# Patient Record
Sex: FEMALE | HISPANIC OR LATINO | Employment: FULL TIME | URBAN - METROPOLITAN AREA
[De-identification: names, ages, dates, MRNs, and addresses within clinical notes are randomized per-mention and may not be internally consistent; named-entity substitution may affect disease eponyms.]

---

## 2017-03-17 ENCOUNTER — NON-PROVIDER VISIT (OUTPATIENT)
Dept: URGENT CARE | Facility: PHYSICIAN GROUP | Age: 32
End: 2017-03-17

## 2017-03-17 DIAGNOSIS — Z02.1 PRE-EMPLOYMENT DRUG SCREENING: ICD-10-CM

## 2017-03-17 LAB
AMP AMPHETAMINE: NORMAL
COC COCAINE: NORMAL
INT CON NEG: NEGATIVE
INT CON POS: POSITIVE
MET METHAMPHETAMINES: NORMAL
OPI OPIATES: NORMAL
PCP PHENCYCLIDINE: NORMAL
POC DRUG COMMENT 753798-OCCUPATIONAL HEALTH: NORMAL
THC: NORMAL

## 2017-03-17 PROCEDURE — 80305 DRUG TEST PRSMV DIR OPT OBS: CPT | Performed by: PHYSICIAN ASSISTANT

## 2018-10-11 ENCOUNTER — NON-PROVIDER VISIT (OUTPATIENT)
Dept: URGENT CARE | Facility: PHYSICIAN GROUP | Age: 33
End: 2018-10-11

## 2018-10-11 DIAGNOSIS — Z02.1 PRE-EMPLOYMENT DRUG SCREENING: Primary | ICD-10-CM

## 2018-10-11 PROCEDURE — 80305 DRUG TEST PRSMV DIR OPT OBS: CPT | Performed by: PHYSICIAN ASSISTANT

## 2018-10-12 LAB
AMP AMPHETAMINE: NORMAL
COC COCAINE: NORMAL
INT CON NEG: NORMAL
INT CON POS: NORMAL
MET METHAMPHETAMINES: NORMAL
OPI OPIATES: NORMAL
PCP PHENCYCLIDINE: NORMAL
POC DRUG COMMENT 753798-OCCUPATIONAL HEALTH: NEGATIVE
THC: NORMAL

## 2020-01-30 ENCOUNTER — TELEPHONE (OUTPATIENT)
Dept: SCHEDULING | Facility: IMAGING CENTER | Age: 35
End: 2020-01-30

## 2020-09-25 ENCOUNTER — APPOINTMENT (OUTPATIENT)
Dept: RADIOLOGY | Facility: IMAGING CENTER | Age: 35
End: 2020-09-25
Attending: PHYSICIAN ASSISTANT
Payer: COMMERCIAL

## 2020-09-25 ENCOUNTER — OFFICE VISIT (OUTPATIENT)
Dept: URGENT CARE | Facility: PHYSICIAN GROUP | Age: 35
End: 2020-09-25
Payer: COMMERCIAL

## 2020-09-25 VITALS
DIASTOLIC BLOOD PRESSURE: 76 MMHG | HEIGHT: 62 IN | RESPIRATION RATE: 16 BRPM | SYSTOLIC BLOOD PRESSURE: 112 MMHG | WEIGHT: 140 LBS | TEMPERATURE: 98.4 F | HEART RATE: 77 BPM | BODY MASS INDEX: 25.76 KG/M2 | OXYGEN SATURATION: 95 %

## 2020-09-25 DIAGNOSIS — J06.9 UPPER RESPIRATORY TRACT INFECTION, UNSPECIFIED TYPE: ICD-10-CM

## 2020-09-25 DIAGNOSIS — R05.9 COUGH: ICD-10-CM

## 2020-09-25 DIAGNOSIS — R06.02 SHORTNESS OF BREATH: ICD-10-CM

## 2020-09-25 PROCEDURE — 71046 X-RAY EXAM CHEST 2 VIEWS: CPT | Mod: TC | Performed by: PHYSICIAN ASSISTANT

## 2020-09-25 PROCEDURE — 99204 OFFICE O/P NEW MOD 45 MIN: CPT | Performed by: PHYSICIAN ASSISTANT

## 2020-09-25 RX ORDER — AZITHROMYCIN 250 MG/1
TABLET, FILM COATED ORAL
Qty: 6 TAB | Refills: 0 | Status: SHIPPED | OUTPATIENT
Start: 2020-09-25 | End: 2022-03-16

## 2020-09-25 RX ORDER — PREDNISONE 20 MG/1
40 TABLET ORAL DAILY
Qty: 10 TAB | Refills: 0 | Status: SHIPPED | OUTPATIENT
Start: 2020-09-25 | End: 2020-09-30

## 2020-09-25 RX ORDER — ALBUTEROL SULFATE 90 UG/1
2 AEROSOL, METERED RESPIRATORY (INHALATION) EVERY 6 HOURS PRN
Qty: 8.5 G | Refills: 0 | Status: SHIPPED | OUTPATIENT
Start: 2020-09-25 | End: 2022-03-16

## 2020-09-25 ASSESSMENT — ENCOUNTER SYMPTOMS
MYALGIAS: 0
SHORTNESS OF BREATH: 1
TINGLING: 0
HEADACHES: 0
NAUSEA: 0
STRIDOR: 0
HEARTBURN: 0
SPUTUM PRODUCTION: 1
ABDOMINAL PAIN: 0
WHEEZING: 1
DIARRHEA: 0
PALPITATIONS: 0
FEVER: 0
SINUS PAIN: 0
DIAPHORESIS: 0
SORE THROAT: 0
DIZZINESS: 0
CHILLS: 0
VOMITING: 0
COUGH: 1
EYE PAIN: 0

## 2020-09-25 NOTE — LETTER
[facility] [place]  [address]  [place] [address]     September 25, 2020    Patient: Yaquelin Mccray   YOB: 1985   Date of Visit: 9/25/2020       To Whom It May Concern:    Yaquelin Mccray was seen and treated in our department on 9/25/2020.  Please excuse from work on 9/25/2020 through 9/28/2020.  Please call with any questions or concerns at [phone].    Sincerely,     Luca Viveros P.A.-C.

## 2020-09-26 NOTE — PROGRESS NOTES
Subjective:   Yaquelin Mccray is a 35 y.o. female who presents for Asthma (feels like asthma is getting worse x1 month , cough x3 months )    HPI:  This is a very pleasant 35-year-old female presenting to the clinic with a cough and shortness of breath which have been ongoing but progressively worsening over the last 3 months.  She states it feels like she has asthma she has never been diagnosed with asthma in the past.  Symptoms are worse at night and early in the morning.  Over the last week her cough has become more productive of clear phlegm.  She denies any associated chest pain, fevers, chills, myalgias or headaches.  She has tried multiple over-the-counter medications without relief.  She denies any past medical history significant for seasonal allergies.  She has no known ill contacts.      Review of Systems   Constitutional: Negative for chills, diaphoresis, fever and malaise/fatigue.   HENT: Negative for congestion, ear pain, sinus pain and sore throat.    Eyes: Negative for pain.   Respiratory: Positive for cough, sputum production, shortness of breath and wheezing. Negative for stridor.    Cardiovascular: Negative for chest pain and palpitations.   Gastrointestinal: Negative for abdominal pain, diarrhea, heartburn, nausea and vomiting.   Musculoskeletal: Negative for myalgias.   Neurological: Negative for dizziness, tingling and headaches.   Endo/Heme/Allergies: Negative for environmental allergies.       Medications:    • amoxicillin Caps  • BACTRIM DS PO  • VICODIN PO    Allergies: Patient has no known allergies.    Problem List: Yaquelin Mccray does not have a problem list on file.    Surgical History:  No past surgical history on file.    Past Social Hx: Yaquelin Mccray  reports that she has never smoked. She has never used smokeless tobacco. She reports that she does not drink alcohol or use drugs.     Past Family Hx:  Yaquelin Truong  "Jamin Gallowayubias family history is not on file.     Problem list, medications, and allergies reviewed by myself today in Epic.     Objective:     /76 (BP Location: Left arm, Patient Position: Sitting, BP Cuff Size: Adult)   Pulse 77   Temp 36.9 °C (98.4 °F) (Temporal)   Resp 16   Ht 1.575 m (5' 2\")   Wt 63.5 kg (140 lb)   SpO2 95%   BMI 25.61 kg/m²     Physical Exam  Constitutional:       General: She is not in acute distress.     Appearance: Normal appearance. She is not ill-appearing, toxic-appearing or diaphoretic.   HENT:      Head: Normocephalic and atraumatic.      Right Ear: Tympanic membrane, ear canal and external ear normal.      Left Ear: Tympanic membrane, ear canal and external ear normal.      Nose: Nose normal. No congestion or rhinorrhea.      Mouth/Throat:      Mouth: Mucous membranes are moist.      Pharynx: No oropharyngeal exudate or posterior oropharyngeal erythema.   Eyes:      Conjunctiva/sclera: Conjunctivae normal.   Neck:      Musculoskeletal: Normal range of motion. No muscular tenderness.   Cardiovascular:      Rate and Rhythm: Normal rate and regular rhythm.      Pulses: Normal pulses.      Heart sounds: Normal heart sounds.   Pulmonary:      Effort: Pulmonary effort is normal.      Breath sounds: Wheezing and rhonchi present. No rales.   Abdominal:      Palpations: Abdomen is soft.      Tenderness: There is no abdominal tenderness.   Lymphadenopathy:      Cervical: No cervical adenopathy.   Skin:     General: Skin is warm and dry.      Capillary Refill: Capillary refill takes less than 2 seconds.   Neurological:      General: No focal deficit present.      Mental Status: She is alert and oriented to person, place, and time. Mental status is at baseline.   Psychiatric:         Mood and Affect: Mood normal.         Thought Content: Thought content normal.     Chest x-ray:  FINDINGS:     The heart is not enlarged. No focal consolidation, pleural effusion or pneumothorax is " identified.  Costophrenic angles are clear. There is mild bronchial wall thickening.     IMPRESSION:  Mild bronchial wall thickening can be seen in bronchitis.    Assessment/Plan:     Diagnosis and associated orders:   1. Shortness of breath    - DX-CHEST-2 VIEWS; Future  - albuterol 108 (90 Base) MCG/ACT Aero Soln inhalation aerosol; Inhale 2 Puffs by mouth every 6 hours as needed for Shortness of Breath.  Dispense: 8.5 g; Refill: 0  - predniSONE (DELTASONE) 20 MG Tab; Take 2 Tabs by mouth every day for 5 days.  Dispense: 10 Tab; Refill: 0  - azithromycin (ZITHROMAX) 250 MG Tab; Take 2 tablets PO on day one, then 1 tablet PO on day two to five.  Dispense: 6 Tab; Refill: 0    2. Cough    - DX-CHEST-2 VIEWS; Future  - albuterol 108 (90 Base) MCG/ACT Aero Soln inhalation aerosol; Inhale 2 Puffs by mouth every 6 hours as needed for Shortness of Breath.  Dispense: 8.5 g; Refill: 0  - predniSONE (DELTASONE) 20 MG Tab; Take 2 Tabs by mouth every day for 5 days.  Dispense: 10 Tab; Refill: 0  - azithromycin (ZITHROMAX) 250 MG Tab; Take 2 tablets PO on day one, then 1 tablet PO on day two to five.  Dispense: 6 Tab; Refill: 0    3. Upper respiratory tract infection, unspecified type    - albuterol 108 (90 Base) MCG/ACT Aero Soln inhalation aerosol; Inhale 2 Puffs by mouth every 6 hours as needed for Shortness of Breath.  Dispense: 8.5 g; Refill: 0  - predniSONE (DELTASONE) 20 MG Tab; Take 2 Tabs by mouth every day for 5 days.  Dispense: 10 Tab; Refill: 0  - azithromycin (ZITHROMAX) 250 MG Tab; Take 2 tablets PO on day one, then 1 tablet PO on day two to five.  Dispense: 6 Tab; Refill: 0       Comments/MDM:       • Patient has had signs and symptoms consistent with viral upper respiratory infection for the past 3 months.  Her symptoms have progressively worsened over this last week.  Her coughs are more frequent and more productive.  She does have increased shortness of breath over this time.  Due to the length of her symptoms  and nature of her symptoms we decided to start on oral steroids and antibiotics.  Prescription for albuterol was also provided.  Red flag symptoms were discussed with the patient today.  If any of these present return to clinic or nearest emergency department.             Differential diagnosis, natural history, supportive care, and indications for immediate follow-up discussed.    Advised the patient to follow-up with the primary care physician for recheck, reevaluation, and consideration of further management.    Please note that this dictation was created using voice recognition software. I have made reasonable attempt to correct obvious errors, but I expect that there are errors of grammar and possibly content that I did not discover before finalizing the note.    This note was electronically signed by DILCIA Viveros PA-C

## 2020-10-06 ENCOUNTER — HOSPITAL ENCOUNTER (OUTPATIENT)
Facility: MEDICAL CENTER | Age: 35
End: 2020-10-06
Attending: NURSE PRACTITIONER
Payer: COMMERCIAL

## 2020-10-06 ENCOUNTER — OFFICE VISIT (OUTPATIENT)
Dept: URGENT CARE | Facility: PHYSICIAN GROUP | Age: 35
End: 2020-10-06
Payer: COMMERCIAL

## 2020-10-06 VITALS
SYSTOLIC BLOOD PRESSURE: 96 MMHG | DIASTOLIC BLOOD PRESSURE: 60 MMHG | WEIGHT: 140.8 LBS | OXYGEN SATURATION: 98 % | RESPIRATION RATE: 16 BRPM | HEIGHT: 62 IN | HEART RATE: 73 BPM | TEMPERATURE: 99.1 F | BODY MASS INDEX: 25.91 KG/M2

## 2020-10-06 DIAGNOSIS — Z87.09 H/O BRONCHITIS: ICD-10-CM

## 2020-10-06 DIAGNOSIS — R06.02 SOB (SHORTNESS OF BREATH): ICD-10-CM

## 2020-10-06 DIAGNOSIS — Z88.9 H/O SEASONAL ALLERGIES: ICD-10-CM

## 2020-10-06 DIAGNOSIS — R05.9 COUGH: ICD-10-CM

## 2020-10-06 PROCEDURE — 99214 OFFICE O/P EST MOD 30 MIN: CPT | Performed by: NURSE PRACTITIONER

## 2020-10-06 PROCEDURE — U0003 INFECTIOUS AGENT DETECTION BY NUCLEIC ACID (DNA OR RNA); SEVERE ACUTE RESPIRATORY SYNDROME CORONAVIRUS 2 (SARS-COV-2) (CORONAVIRUS DISEASE [COVID-19]), AMPLIFIED PROBE TECHNIQUE, MAKING USE OF HIGH THROUGHPUT TECHNOLOGIES AS DESCRIBED BY CMS-2020-01-R: HCPCS

## 2020-10-06 RX ORDER — ALBUTEROL SULFATE 90 UG/1
2 AEROSOL, METERED RESPIRATORY (INHALATION) EVERY 6 HOURS PRN
Qty: 8.5 G | Refills: 0 | Status: SHIPPED | OUTPATIENT
Start: 2020-10-06 | End: 2020-10-09

## 2020-10-06 RX ORDER — BENZONATATE 100 MG/1
100 CAPSULE ORAL 3 TIMES DAILY PRN
Qty: 60 CAP | Refills: 0 | Status: SHIPPED | OUTPATIENT
Start: 2020-10-06 | End: 2022-03-16

## 2020-10-06 ASSESSMENT — ENCOUNTER SYMPTOMS
WHEEZING: 0
WEAKNESS: 0
PALPITATIONS: 0
SENSORY CHANGE: 0
ORTHOPNEA: 0
CHILLS: 0
FEVER: 0
SINUS PAIN: 0
SPUTUM PRODUCTION: 1
BACK PAIN: 0
TINGLING: 0
DIZZINESS: 0
HEADACHES: 0
COUGH: 1
MYALGIAS: 0
SHORTNESS OF BREATH: 1
SORE THROAT: 0

## 2020-10-07 DIAGNOSIS — Z88.9 H/O SEASONAL ALLERGIES: ICD-10-CM

## 2020-10-07 DIAGNOSIS — R05.9 COUGH: ICD-10-CM

## 2020-10-07 DIAGNOSIS — Z87.09 H/O BRONCHITIS: ICD-10-CM

## 2020-10-07 LAB
COVID ORDER STATUS COVID19: NORMAL
SARS-COV-2 RNA RESP QL NAA+PROBE: NOTDETECTED
SPECIMEN SOURCE: NORMAL

## 2020-10-07 NOTE — PROGRESS NOTES
Subjective:      Yaquelin Mccray is a 35 y.o. female who presents with Shortness of Breath (difficulty breathing, cough, phlegm, x2 days )            HPI  States cough, SOB, mld intermittent phlegm (yellow). Seen in  2 weeks ago for similar symptoms. Denies h/o asthma but has mild Fall seasonal allergies. No allergy med used. Denies sore throat or PND. CXR 2 weeks ago, WNL. H/o bronchitis. Denies wheeze. No otc meds taken. Supine positions increases cough, walking she has no cough. No ear problems. No known exposure to [place], works [employer].     PMH:  has no past medical history on file.  MEDS:   Current Outpatient Medications:   •  albuterol 108 (90 Base) MCG/ACT Aero Soln inhalation aerosol, Inhale 2 Puffs by mouth every 6 hours as needed for Shortness of Breath., Disp: 8.5 g, Rfl: 0  •  azithromycin (ZITHROMAX) 250 MG Tab, Take 2 tablets PO on day one, then 1 tablet PO on day two to five., Disp: 6 Tab, Rfl: 0  •  AMOXICILLIN 500 MG PO CAPS, , Disp: , Rfl:   •  BACTRIM DS PO, , Disp: , Rfl:   •  VICODIN PO, , Disp: , Rfl:   ALLERGIES: No Known Allergies  SURGHX: History reviewed. No pertinent surgical history.  SOCHX:  reports that she has never smoked. She has never used smokeless tobacco. She reports that she does not drink alcohol or use drugs.  FH: Family history was reviewed, no pertinent findings to report    Review of Systems   Constitutional: Negative for chills, fever and malaise/fatigue.   HENT: Positive for congestion. Negative for ear pain, sinus pain and sore throat.    Respiratory: Positive for cough, sputum production and shortness of breath. Negative for wheezing.    Cardiovascular: Negative for chest pain, palpitations and orthopnea.   Musculoskeletal: Negative for back pain and myalgias.   Skin: Negative for itching and rash.   Neurological: Negative for dizziness, tingling, sensory change, weakness and headaches.   Endo/Heme/Allergies: Positive for  "environmental allergies.   All other systems reviewed and are negative.         Objective:     BP (!) 96/60 (BP Location: Right arm, Patient Position: Sitting, BP Cuff Size: Adult)   Pulse 73   Temp 37.3 °C (99.1 °F) (Temporal)   Resp 16   Ht 1.575 m (5' 2\")   Wt 63.9 kg (140 lb 12.8 oz)   SpO2 98%   BMI 25.75 kg/m²      Physical Exam  Vitals signs reviewed.   Constitutional:       General: She is awake. She is not in acute distress.     Appearance: Normal appearance. She is well-developed. She is not ill-appearing, toxic-appearing or diaphoretic.   HENT:      Head: Normocephalic.      Right Ear: Tympanic membrane, ear canal and external ear normal.      Left Ear: Ear canal and external ear normal. A middle ear effusion is present.      Nose: Congestion and rhinorrhea present. No nasal tenderness or mucosal edema.      Mouth/Throat:      Mouth: Mucous membranes are moist.      Pharynx: Posterior oropharyngeal erythema present. No pharyngeal swelling, oropharyngeal exudate or uvula swelling.      Tonsils: No tonsillar exudate or tonsillar abscesses. 1+ on the right. 1+ on the left.   Eyes:      General:         Right eye: No discharge.         Left eye: No discharge.      Conjunctiva/sclera: Conjunctivae normal.      Pupils: Pupils are equal, round, and reactive to light.   Neck:      Musculoskeletal: Normal range of motion.   Cardiovascular:      Rate and Rhythm: Normal rate.   Pulmonary:      Effort: Pulmonary effort is normal. No tachypnea, accessory muscle usage or respiratory distress.      Breath sounds: Normal breath sounds and air entry. No stridor, decreased air movement or transmitted upper airway sounds. No decreased breath sounds, wheezing, rhonchi or rales.   Abdominal:      General: Bowel sounds are normal. There is no distension.      Palpations: Abdomen is soft.      Tenderness: There is no abdominal tenderness. There is no guarding or rebound.   Musculoskeletal: Normal range of motion. "   Lymphadenopathy:      Cervical: No cervical adenopathy.   Skin:     General: Skin is warm and dry.   Neurological:      Mental Status: She is alert and oriented to person, place, and time.   Psychiatric:         Behavior: Behavior is cooperative.               Provider wore N95 and/or surgical mask, eye goggles and/or gloves with hand sanitization during exam.    Assessment/Plan:        1. H/O seasonal allergies    - REFERRAL TO FOLLOW-UP WITH PRIMARY CARE  - COVID/SARS COV-2 PCR; Future    2. Cough  - REFERRAL TO FOLLOW-UP WITH PRIMARY CARE  - COVID/SARS COV-2 PCR; Future    3. H/O bronchitis    - REFERRAL TO FOLLOW-UP WITH PRIMARY CARE  - COVID/SARS COV-2 PCR; Future    4. SOB (shortness of breath)    Recommend longer acting allergy med x 7 days  Increase water intake  May use Tylenol prn for any fever or body aches  Get rest  Salt water gargle prn  Flonase prn   Monitor for fevers, worse cough, SOB, CP, chest tightness, sinus problems- need re-evaluation  AVS summary printed with COVID info provided  [facility] for copy of test result or go through Medical Records    May call listed phone number below/may leave message for COVID result, may take up to 48 hrs for result, patient notified    Call this number [phone] leave message

## 2020-10-07 NOTE — PATIENT INSTRUCTIONS
INSTRUCTIONS FOR COVID-19 OR ANY OTHER INFECTIOUS RESPIRATORY ILLNESSES    The Centers for Disease Control and Prevention (CDC) states that early indications for COVID-19 include cough, shortness of breath, difficulty breathing, or at least two of the following symptoms: chills, shaking with chills, muscle pain, headache, sore throat, and loss of taste or smell. Symptoms can range from mild to severe and may appear up to two weeks after exposure to the virus.    The practice of self-isolation and quarantine helps protect the public and your family by  preventing exposure to people who have or may have a contagious disease. Please follow the prevention steps below as based on CDC guidelines:    WHEN TO STOP ISOLATION: Persons with COVID-19 or any other infectious respiratory illness who have symptoms and were advised to care for themselves at home may discontinue home isolation under the following conditions:  · At least 24 hours have passed since recovery defined as resolution of fever without the use of fever-reducing medications; AND,  · Improvement in respiratory symptoms (e.g., cough, shortness of breath); AND,  · At least 10 days have passed since symptoms first appeared and have had no subsequent illness.    MONITOR YOUR SYMPTOMS: If your illness is worsening, seek prompt medical attention. If you have a medical emergency and need to call 911, notify the dispatch personnel that you have, or are being evaluated for confirmed or suspected COVID-19 or another infectious respiratory illness. Wear a facemask if possible.    ACTIVITY RESTRICTION: restrict activities outside your home, except for getting medical care. Do not go to work, school, or public areas. Avoid using public transportation, ride-sharing, or taxis.    SCHEDULED MEDICAL APPOINTMENTS: Notify your provider that you have, or are being evaluated for, confirmed or suspected COVID-19 or another infectious respiratory. This will help the healthcare  provider’s office safely take care of you and keep other people from getting exposed or infected.    FACEMASKS, when to wear: Anytime you are away from your home or around other people or pets. If you are unable to wear one, maintain a minimum of 6 feet distancing from others.    LIVING ENVIRONMENT: Stay in a separate room from other people and pets. If possible, use a separate bathroom, have someone else care for your pets and avoid sharing household items. Any items used should be washed thoroughly with soap and water. Clean all “high-touch” surfaces every day. Use a household cleaning spray or wipe, according to the label instructions. High touch surfaces include (but are not limited to) counters, tabletops, doorknobs, bathroom fixtures, toilets, phones, keyboards, tablets, and bedside tables.     HAND WASHING: Frequently wash hands with soap and water for at least 20 seconds,  especially after blowing your nose, coughing, or sneezing; going to the bathroom; before and after interacting with pets; and before and after eating or preparing food. If hands are visibly dirty use soap and water. If soap and water are not available, use an alcohol-based hand  with at least 60% alcohol. Avoid touching your eyes, nose, and mouth with unwashed hands. Cover your coughs and sneezes with a tissue. Throw used tissues in a lined trash can. Immediately wash your hands.    ACTIVE/FACILITATED SELF-MONITORING: Follow instructions provided by your local health department or health professionals, as appropriate. When working with your local health department check their available hours.    [facility]   Phone Number   [facility] [phone]   [facility] [place] [phone]   [place] [address]   [place] [phone]     IF YOU HAVE CONFIRMED POSITIVE COVID-19:    Those who have completely recovered from COVID-19 may have immune-boosting antibodies in their plasma--called “convalescent plasma”--that could be  used to treat critically ill COVID19 patients.    [facility] is excited to begin working with [facility] on collecting convalescent plasma from  people who have recovered from COVID-19 as part of a program to treat patients infected with the virus. This FDA-approved “emergency investigational new drug” is a special blood product containing antibodies that may give patients an extra boost to fight the virus.    To be eligible to donate convalescent plasma, you must have a prior COVID-19 diagnosis documented by a laboratory test (or a positive test result for SARS-CoV-2 antibodies) and meet additional eligibility requirements.    If you are interested in donating convalescent plasma or have any additional questions, please contact the [facility] Convalescent Plasma  at [phone] or via e-mail at [email or web address].

## 2020-10-08 ENCOUNTER — TELEPHONE (OUTPATIENT)
Dept: URGENT CARE | Facility: CLINIC | Age: 35
End: 2020-10-08

## 2020-10-08 NOTE — TELEPHONE ENCOUNTER
Called and left message on given phone number at time of visit [phone] (in chart notes) regarding COVID test result to be negative.

## 2020-10-09 ENCOUNTER — OFFICE VISIT (OUTPATIENT)
Dept: MEDICAL GROUP | Facility: PHYSICIAN GROUP | Age: 35
End: 2020-10-09
Payer: COMMERCIAL

## 2020-10-09 VITALS
HEART RATE: 74 BPM | HEIGHT: 62 IN | DIASTOLIC BLOOD PRESSURE: 72 MMHG | BODY MASS INDEX: 26.31 KG/M2 | RESPIRATION RATE: 16 BRPM | OXYGEN SATURATION: 97 % | TEMPERATURE: 98.1 F | WEIGHT: 143 LBS | SYSTOLIC BLOOD PRESSURE: 114 MMHG

## 2020-10-09 DIAGNOSIS — N83.201 CYST OF RIGHT OVARY: ICD-10-CM

## 2020-10-09 DIAGNOSIS — R05.9 COUGH: ICD-10-CM

## 2020-10-09 PROCEDURE — 99204 OFFICE O/P NEW MOD 45 MIN: CPT | Performed by: FAMILY MEDICINE

## 2020-10-09 RX ORDER — DEXAMETHASONE 4 MG/1
1 TABLET ORAL 2 TIMES DAILY
Qty: 1 EACH | Refills: 3 | Status: SHIPPED | OUTPATIENT
Start: 2020-10-09 | End: 2020-10-16

## 2020-10-09 ASSESSMENT — PATIENT HEALTH QUESTIONNAIRE - PHQ9: CLINICAL INTERPRETATION OF PHQ2 SCORE: 0

## 2020-10-09 NOTE — ASSESSMENT & PLAN NOTE
This is a new condition.    Symptom onset: has developed cough over the past 2 months exacerbated by the smoke.   Current symptoms: persistent cough and SOB  Since onset symptoms are: Unchanged  Modifying factors: has gone to  for this and tested negative for covid 19 x2.   Treatments tried: Uses albuterol which helps  Associated symptoms: endorses wheezing

## 2020-10-09 NOTE — ASSESSMENT & PLAN NOTE
This is a new problem  The patient is s/p pelvic US 2 months ago, ordered by the health dept and was told that she has a right ovarian cyst.  Now has on/off pain.  She would like referral to gyn  She does not take anything for it

## 2020-10-09 NOTE — PROGRESS NOTES
Subjective:     CC:  Diagnoses of Cough and Cyst of right ovary were pertinent to this visit.    HISTORY OF THE PRESENT ILLNESS: Patient is a 35 y.o. female. This pleasant patient is here today to establish care and discuss the following problems.   Prior PCP: None.    Cough  This is a new condition.    Symptom onset: has developed cough over the past 2 months exacerbated by the smoke.   Current symptoms: persistent cough and SOB  Since onset symptoms are: Unchanged  Modifying factors: has gone to  for this and tested negative for covid 19 x2.   Treatments tried: Uses albuterol which helps  Associated symptoms: endorses wheezing      Cyst of right ovary  This is a new problem  The patient is s/p pelvic US 2 months ago, ordered by the health dept and was told that she has a right ovarian cyst.  Now has on/off pain.  She would like referral to gyn  She does not take anything for it      Allergies: Patient has no known allergies.    Current Outpatient Medications Ordered in Epic   Medication Sig Dispense Refill   • fluticasone (FLOVENT HFA) 110 MCG/ACT Aerosol Inhale 1 Puff by mouth 2 times a day. 1 Each 3   • benzonatate (TESSALON) 100 MG Cap Take 1 Cap by mouth 3 times a day as needed for Cough. 60 Cap 0   • albuterol 108 (90 Base) MCG/ACT Aero Soln inhalation aerosol Inhale 2 Puffs by mouth every 6 hours as needed for Shortness of Breath. 8.5 g 0   • azithromycin (ZITHROMAX) 250 MG Tab Take 2 tablets PO on day one, then 1 tablet PO on day two to five. 6 Tab 0   • VICODIN PO        No current Epic-ordered facility-administered medications on file.        History reviewed. No pertinent past medical history.    Past Surgical History:   Procedure Laterality Date   • PRIMARY C SECTION         Social History     Tobacco Use   • Smoking status: Never Smoker   • Smokeless tobacco: Never Used   Substance Use Topics   • Alcohol use: No     Comment: social   • Drug use: No       Social History     Social History Narrative     "** Merged History Encounter **            Family History   Problem Relation Age of Onset   • No Known Problems Mother    • No Known Problems Father        Health Maintenance: Completed    ROS:   Gen: no fevers/chills, no changes in weight  Eyes: no changes in vision  ENT: no sore throat, no hearing loss, no bloody nose  Pulm: Per HPI  CV: no chest pain, no palpitations  GI: no nausea/vomiting, no diarrhea  : no dysuria  MSk: no myalgias  Skin: no rash  Neuro: no headaches, no numbness/tingling  Heme/Lymph: no easy bruising      Objective:     Exam: /72 (BP Location: Left arm, Patient Position: Sitting, BP Cuff Size: Adult)   Pulse 74   Temp 36.7 °C (98.1 °F) (Temporal)   Resp 16   Ht 1.575 m (5' 2\")   Wt 64.9 kg (143 lb)   SpO2 97%  Body mass index is 26.16 kg/m².    General: Normal appearing. No distress.  HENT: Normocephalic. Ears normal shape and contour, canals are clear bilaterally, tympanic membranes are benign, nasal mucosa benign, oropharynx is without erythema, edema or exudates.   Eyes: Conjunctiva clear lids without ptosis, pupils equal and reactive to light accommodation.  Neck: Supple without JVD. Thyroid is not enlarged.  Pulmonary: Clear to ausculation.  Normal effort. No rales, ronchi, or wheezing.  Cardiovascular: Regular rate and rhythm without murmur. Radial pulses are intact and equal bilaterally.  Abdomen: Soft, nontender, nondistended. Normal bowel sounds. Liver and spleen are not palpable  Neurologic: Moves all extremities  Lymph: No cervical or supraclavicular lymph nodes are palpable  Skin: Warm and dry.  No obvious lesions.  Musculoskeletal: Normal gait. No extremity cyanosis, clubbing, or edema.  Psych: Normal mood and affect. Alert and oriented x3. Judgment and insight is normal.    Assessment & Plan:   35 y.o. female with the following -    1. Cough  This is a new problem.  The patient has been experiencing cough over the past 2 months.  She has gone to urgent care x2 for " this.  She has also tested negative for COVID-19 x2.  Chest x-ray suggestive of bronchitis.  Reactive airway disease/asthma is also considered.  She has been using albuterol inhaler as needed every night which improves her symptoms.  Endorses wheezing.  We will do a trial of ICS at the same time.  Return precautions given.  - fluticasone (FLOVENT HFA) 110 MCG/ACT Aerosol; Inhale 1 Puff by mouth 2 times a day.  Dispense: 1 Each; Refill: 3    2. Cyst of right ovary  This is a new problem.  A couple of months ago she had an ultrasound of the pelvis which was positive for a right ovarian cyst.  She would like to establish with gynecology to go over this.  She may take NSAIDs for now to relieve her pain.  - REFERRAL TO OB/GYN      Return in about 3 months (around 1/9/2021).    Please note that this dictation was created using voice recognition software. I have made every reasonable attempt to correct obvious errors, but I expect that there are errors of grammar and possibly content that I did not discover before finalizing the note.

## 2020-10-16 ENCOUNTER — TELEPHONE (OUTPATIENT)
Dept: MEDICAL GROUP | Facility: PHYSICIAN GROUP | Age: 35
End: 2020-10-16

## 2020-10-16 RX ORDER — BECLOMETHASONE DIPROPIONATE HFA 80 UG/1
1 AEROSOL, METERED RESPIRATORY (INHALATION) 2 TIMES DAILY
Qty: 1 EACH | Refills: 3 | Status: SHIPPED | OUTPATIENT
Start: 2020-10-16 | End: 2022-03-16

## 2020-10-16 NOTE — TELEPHONE ENCOUNTER
Phone Number Called: [phone] (home)       Call outcome: Spoke to patient regarding message below.    Message: Patient was advised if inhaler was not covered by insurance to call and have a different one sent to the pharmacy. Please Advise. Lm

## 2021-08-31 ENCOUNTER — NON-PROVIDER VISIT (OUTPATIENT)
Dept: URGENT CARE | Facility: PHYSICIAN GROUP | Age: 36
End: 2021-08-31
Payer: COMMERCIAL

## 2021-08-31 DIAGNOSIS — Z02.1 PRE-EMPLOYMENT DRUG SCREENING: Primary | ICD-10-CM

## 2021-08-31 PROCEDURE — 80305 DRUG TEST PRSMV DIR OPT OBS: CPT | Performed by: PHYSICIAN ASSISTANT

## 2021-09-01 LAB
AMP AMPHETAMINE: NORMAL
BAR BARBITURATES: NORMAL
BZO BENZODIAZEPINES: NORMAL
COC COCAINE: NORMAL
INT CON NEG: NORMAL
INT CON POS: NORMAL
MDMA ECSTASY: NORMAL
MET METHAMPHETAMINES: NORMAL
MTD METHADONE: NORMAL
OPI OPIATES: NORMAL
OXY OXYCODONE: NORMAL
PCP PHENCYCLIDINE: NORMAL
POC URINE DRUG SCREEN OCDRS: NEGATIVE
THC: NORMAL

## 2021-09-09 ENCOUNTER — HOSPITAL ENCOUNTER (OUTPATIENT)
Facility: MEDICAL CENTER | Age: 36
End: 2021-09-09
Attending: NURSE PRACTITIONER
Payer: MEDICAID

## 2021-09-09 ENCOUNTER — OFFICE VISIT (OUTPATIENT)
Dept: URGENT CARE | Facility: CLINIC | Age: 36
End: 2021-09-09
Payer: MEDICAID

## 2021-09-09 ENCOUNTER — APPOINTMENT (OUTPATIENT)
Dept: URGENT CARE | Facility: PHYSICIAN GROUP | Age: 36
End: 2021-09-09
Payer: COMMERCIAL

## 2021-09-09 VITALS
WEIGHT: 147.8 LBS | TEMPERATURE: 98.6 F | OXYGEN SATURATION: 95 % | RESPIRATION RATE: 16 BRPM | BODY MASS INDEX: 29.02 KG/M2 | HEIGHT: 60 IN | SYSTOLIC BLOOD PRESSURE: 122 MMHG | DIASTOLIC BLOOD PRESSURE: 66 MMHG | HEART RATE: 85 BPM

## 2021-09-09 DIAGNOSIS — Z11.52 ENCOUNTER FOR SCREENING FOR COVID-19: Primary | ICD-10-CM

## 2021-09-09 PROCEDURE — U0003 INFECTIOUS AGENT DETECTION BY NUCLEIC ACID (DNA OR RNA); SEVERE ACUTE RESPIRATORY SYNDROME CORONAVIRUS 2 (SARS-COV-2) (CORONAVIRUS DISEASE [COVID-19]), AMPLIFIED PROBE TECHNIQUE, MAKING USE OF HIGH THROUGHPUT TECHNOLOGIES AS DESCRIBED BY CMS-2020-01-R: HCPCS

## 2021-09-09 PROCEDURE — U0005 INFEC AGEN DETEC AMPLI PROBE: HCPCS

## 2021-09-09 PROCEDURE — 99212 OFFICE O/P EST SF 10 MIN: CPT | Mod: CS | Performed by: NURSE PRACTITIONER

## 2021-09-09 ASSESSMENT — ENCOUNTER SYMPTOMS
CHILLS: 0
MYALGIAS: 0
NAUSEA: 0
FEVER: 0
VOMITING: 0

## 2021-09-09 NOTE — PROGRESS NOTES
Subjective:     Yaquelin Mccray is a 36 y.o. female who presents for Coronavirus Screening (needs test to travel)      HPI  Pt presents for evaluation of a new problem. Yaquelin is a 36 year old female who presents to  today for a COVID screen as she needs to travel. No symptoms, no known ill contacts.     Review of Systems   Constitutional: Negative for chills and fever.   Gastrointestinal: Negative for nausea and vomiting.   Musculoskeletal: Negative for myalgias.       PMH: No past medical history on file.  ALLERGIES: No Known Allergies  SURGHX:   Past Surgical History:   Procedure Laterality Date   • PRIMARY C SECTION       SOCHX:   Social History     Socioeconomic History   • Marital status:      Spouse name: Not on file   • Number of children: Not on file   • Years of education: Not on file   • Highest education level: Not on file   Occupational History   • Not on file   Tobacco Use   • Smoking status: Never Smoker   • Smokeless tobacco: Never Used   Vaping Use   • Vaping Use: Never used   Substance and Sexual Activity   • Alcohol use: No     Comment: social   • Drug use: No   • Sexual activity: Not on file   Other Topics Concern   • Not on file   Social History Narrative    ** Merged History Encounter **          Social Determinants of Health     Financial Resource Strain:    • Difficulty of Paying Living Expenses:    Food Insecurity:    • Worried About Running Out of Food in the Last Year:    • Ran Out of Food in the Last Year:    Transportation Needs:    • Lack of Transportation (Medical):    • Lack of Transportation (Non-Medical):    Physical Activity:    • Days of Exercise per Week:    • Minutes of Exercise per Session:    Stress:    • Feeling of Stress :    Social Connections:    • Frequency of Communication with Friends and Family:    • Frequency of Social Gatherings with Friends and Family:    • Attends [employer]oration[employer] Services:    • Active Member of Clubs or Organizations:    •  "Attends Club or Organization Meetings:    • Marital Status:    Intimate Partner Violence:    • Fear of Current or Ex-Partner:    • Emotionally Abused:    • Physically Abused:    • Sexually Abused:      FH:   Family History   Problem Relation Age of Onset   • No Known Problems Mother    • No Known Problems Father          Objective:   /66 (BP Location: Left arm, Patient Position: Sitting, BP Cuff Size: Adult long)   Pulse 85   Temp 37 °C (98.6 °F) (Temporal)   Resp 16   Ht 1.53 m (5' 0.24\")   Wt 67 kg (147 lb 12.8 oz)   SpO2 95%   BMI 28.64 kg/m²     Physical Exam  Vitals and nursing note reviewed.   Constitutional:       General: She is not in acute distress.     Appearance: Normal appearance. She is not ill-appearing.   HENT:      Head: Normocephalic and atraumatic.      Right Ear: External ear normal.      Left Ear: External ear normal.      Nose: No congestion or rhinorrhea.      Mouth/Throat:      Mouth: Mucous membranes are moist.   Eyes:      Extraocular Movements: Extraocular movements intact.      Pupils: Pupils are equal, round, and reactive to light.   Cardiovascular:      Rate and Rhythm: Normal rate and regular rhythm.      Pulses: Normal pulses.      Heart sounds: Normal heart sounds.   Pulmonary:      Effort: Pulmonary effort is normal.      Breath sounds: Normal breath sounds.   Abdominal:      General: Abdomen is flat. Bowel sounds are normal.      Palpations: Abdomen is soft.      Tenderness: There is no abdominal tenderness. There is no right CVA tenderness or left CVA tenderness.   Musculoskeletal:         General: Normal range of motion.      Cervical back: Normal range of motion and neck supple.   Skin:     General: Skin is warm and dry.      Capillary Refill: Capillary refill takes less than 2 seconds.   Neurological:      General: No focal deficit present.      Mental Status: She is alert and oriented to person, place, and time. Mental status is at baseline.   Psychiatric:         " Mood and Affect: Mood normal.         Behavior: Behavior normal.         Thought Content: Thought content normal.         Judgment: Judgment normal.         Assessment/Plan:   Assessment    1. Encounter for screening for COVID-19  SARS-CoV-2 PCR (24 hour In-House): Collect NP swab in VTM       Pt was tested for COVID today. I will call with results. Upon entering the room PPE was worn throughout the duration of his visit for both provider and patient. Mask of patient briefly removed for examination of oropharynx. PT was educated to remain in self isolation for at least 10 days from onset of symptoms followed by an additional 24-hour period of being symptom-free without the use of symptom altering medication.      AVS handout given and reviewed with patient. Pt educated on red flags and when to seek treatment back in ER or UC.

## 2021-09-10 DIAGNOSIS — Z11.52 ENCOUNTER FOR SCREENING FOR COVID-19: ICD-10-CM

## 2021-09-10 LAB — COVID ORDER STATUS COVID19: NORMAL

## 2021-09-11 LAB
SARS-COV-2 RNA RESP QL NAA+PROBE: NOTDETECTED
SPECIMEN SOURCE: NORMAL

## 2022-03-16 ENCOUNTER — OFFICE VISIT (OUTPATIENT)
Dept: URGENT CARE | Facility: PHYSICIAN GROUP | Age: 37
End: 2022-03-16

## 2022-03-16 VITALS
HEIGHT: 60 IN | DIASTOLIC BLOOD PRESSURE: 62 MMHG | WEIGHT: 155 LBS | TEMPERATURE: 98.1 F | RESPIRATION RATE: 20 BRPM | SYSTOLIC BLOOD PRESSURE: 114 MMHG | BODY MASS INDEX: 30.43 KG/M2 | OXYGEN SATURATION: 97 % | HEART RATE: 76 BPM

## 2022-03-16 DIAGNOSIS — J45.901 ASTHMA WITH ACUTE EXACERBATION, UNSPECIFIED ASTHMA SEVERITY, UNSPECIFIED WHETHER PERSISTENT: ICD-10-CM

## 2022-03-16 DIAGNOSIS — Z76.0 MEDICATION REFILL: ICD-10-CM

## 2022-03-16 PROCEDURE — 94640 AIRWAY INHALATION TREATMENT: CPT | Performed by: PHYSICIAN ASSISTANT

## 2022-03-16 PROCEDURE — 99214 OFFICE O/P EST MOD 30 MIN: CPT | Mod: 25 | Performed by: PHYSICIAN ASSISTANT

## 2022-03-16 RX ORDER — PREDNISONE 20 MG/1
TABLET ORAL
Qty: 8 TABLET | Refills: 0 | Status: SHIPPED | OUTPATIENT
Start: 2022-03-16 | End: 2023-05-09

## 2022-03-16 RX ORDER — IPRATROPIUM BROMIDE AND ALBUTEROL SULFATE 2.5; .5 MG/3ML; MG/3ML
3 SOLUTION RESPIRATORY (INHALATION) ONCE
Status: COMPLETED | OUTPATIENT
Start: 2022-03-16 | End: 2022-03-16

## 2022-03-16 RX ORDER — ALBUTEROL SULFATE 90 UG/1
2 AEROSOL, METERED RESPIRATORY (INHALATION) EVERY 6 HOURS PRN
Qty: 8.5 G | Refills: 1 | Status: SHIPPED | OUTPATIENT
Start: 2022-03-16 | End: 2023-05-09

## 2022-03-16 RX ADMIN — IPRATROPIUM BROMIDE AND ALBUTEROL SULFATE 3 ML: 2.5; .5 SOLUTION RESPIRATORY (INHALATION) at 11:07

## 2022-03-16 ASSESSMENT — ENCOUNTER SYMPTOMS
WHEEZING: 1
SPUTUM PRODUCTION: 0
NAUSEA: 0
DIZZINESS: 0
VOMITING: 0
COUGH: 1
FEVER: 0
SHORTNESS OF BREATH: 1
HEADACHES: 0
CHILLS: 0
FOCAL WEAKNESS: 0

## 2022-03-16 NOTE — LETTER
March 16, 2022         Patient: Yaquelin Mccray   YOB: 1985   Date of Visit: 3/16/2022           To Whom it May Concern:    Yaquelin Mccray was seen in my clinic on 3/16/2022.     If you have any questions or concerns, please don't hesitate to call.        Sincerely,           Alyson Pena P.A.-C.  Electronically Signed

## 2022-03-16 NOTE — PROGRESS NOTES
Subjective     Yaquelin Mccray is a 37 y.o. female who presents with Shortness of Breath (X1 week)    HPI:  Yaquelin Mccray is a 37 y.o. female who presents today for evaluation of shortness of breath.  Patient is with her son, who helps to translate.  They report that she has been diagnosed with asthma approximately 2 years ago and she normally has an inhaler that she uses.  Over the past month she has had some gradually worsening shortness of breath and has been coughing more and she ran out of her inhaler a little over 1 week ago.  She is here today hoping to get a refill on her inhaler.  States that she tried to get it through the pharmacy but was told that she needed a prescription.       Review of Systems   Constitutional: Negative for chills and fever.   Respiratory: Positive for cough, shortness of breath and wheezing. Negative for sputum production.    Gastrointestinal: Negative for nausea and vomiting.   Neurological: Negative for dizziness, focal weakness and headaches.         PMH:  has no past medical history on file.  MEDS:   Current Outpatient Medications:   •  albuterol 108 (90 Base) MCG/ACT Aero Soln inhalation aerosol, Inhale 2 Puffs by mouth every 6 hours as needed for Shortness of Breath., Disp: 8.5 g, Rfl: 0  ALLERGIES: No Known Allergies  SURGHX:   Past Surgical History:   Procedure Laterality Date   • PRIMARY C SECTION       SOCHX:  reports that she has never smoked. She has never used smokeless tobacco. She reports that she does not drink alcohol and does not use drugs.  FH: Family history was reviewed, no pertinent findings to report      Objective     /62 (BP Location: Right arm, Patient Position: Sitting, BP Cuff Size: Adult)   Pulse 76   Temp 36.7 °C (98.1 °F) (Temporal)   Resp 20   Ht 1.524 m (5')   Wt 70.3 kg (155 lb)   SpO2 97%   BMI 30.27 kg/m²      Physical Exam  Constitutional:       Appearance: She is well-developed.   HENT:       Head: Normocephalic and atraumatic.      Right Ear: External ear normal.      Left Ear: External ear normal.   Eyes:      Conjunctiva/sclera: Conjunctivae normal.      Pupils: Pupils are equal, round, and reactive to light.   Cardiovascular:      Rate and Rhythm: Normal rate and regular rhythm.      Heart sounds: Normal heart sounds. No murmur heard.  Pulmonary:      Effort: Pulmonary effort is normal.      Breath sounds: Wheezing present.   Musculoskeletal:      Cervical back: Normal range of motion.   Lymphadenopathy:      Cervical: No cervical adenopathy.   Skin:     General: Skin is warm and dry.      Capillary Refill: Capillary refill takes less than 2 seconds.   Neurological:      Mental Status: She is alert and oriented to person, place, and time.   Psychiatric:         Behavior: Behavior normal.         Judgment: Judgment normal.         Patient reported feeling significant improvement in her shortness of breath status post nebulizer treatment.  Wheezes were also no longer auscultated.    Assessment & Plan     1. Asthma with acute exacerbation, unspecified asthma severity, unspecified whether persistent  - albuterol 108 (90 Base) MCG/ACT Aero Soln inhalation aerosol; Inhale 2 Puffs every 6 hours as needed for Shortness of Breath.  Dispense: 8.5 g; Refill: 1  - Referral to establish with [facility] PCP  - ipratropium-albuterol (DUONEB) nebulizer solution  - predniSONE (DELTASONE) 20 MG Tab; Take 2 tabs once daily for 4 days  Dispense: 8 Tablet; Refill: 0    2. Medication refill  - albuterol 108 (90 Base) MCG/ACT Aero Soln inhalation aerosol; Inhale 2 Puffs every 6 hours as needed for Shortness of Breath.  Dispense: 8.5 g; Refill: 1  - Referral to establish with [facility] PCP            Differential Diagnosis, natural history, and supportive care discussed. Return to the Urgent Care or follow up with your PCP if symptoms fail to resolve, or for any new or worsening symptoms. Emergency room precautions discussed.  Patient and/or family appears understanding of information.

## 2023-05-09 ENCOUNTER — HOSPITAL ENCOUNTER (INPATIENT)
Facility: MEDICAL CENTER | Age: 38
LOS: 2 days | DRG: 202 | End: 2023-05-11
Attending: EMERGENCY MEDICINE | Admitting: HOSPITALIST
Payer: COMMERCIAL

## 2023-05-09 ENCOUNTER — APPOINTMENT (OUTPATIENT)
Dept: RADIOLOGY | Facility: MEDICAL CENTER | Age: 38
DRG: 202 | End: 2023-05-09
Attending: EMERGENCY MEDICINE
Payer: COMMERCIAL

## 2023-05-09 DIAGNOSIS — J96.01 ACUTE HYPOXEMIC RESPIRATORY FAILURE (HCC): ICD-10-CM

## 2023-05-09 DIAGNOSIS — J45.21 INTERMITTENT ASTHMA WITH ACUTE EXACERBATION, UNSPECIFIED ASTHMA SEVERITY: ICD-10-CM

## 2023-05-09 DIAGNOSIS — J45.41 MODERATE PERSISTENT ASTHMA WITH ACUTE EXACERBATION: ICD-10-CM

## 2023-05-09 PROBLEM — R74.8 ELEVATED LIVER ENZYMES: Status: ACTIVE | Noted: 2023-05-09

## 2023-05-09 PROBLEM — J45.901 ACUTE ASTHMA EXACERBATION: Status: ACTIVE | Noted: 2023-05-09

## 2023-05-09 LAB
ALBUMIN SERPL BCP-MCNC: 4.3 G/DL (ref 3.2–4.9)
ALBUMIN/GLOB SERPL: 1 G/DL
ALP SERPL-CCNC: 139 U/L (ref 30–99)
ALT SERPL-CCNC: 99 U/L (ref 2–50)
ANION GAP SERPL CALC-SCNC: 12 MMOL/L (ref 7–16)
AST SERPL-CCNC: 76 U/L (ref 12–45)
BASOPHILS # BLD AUTO: 0.8 % (ref 0–1.8)
BASOPHILS # BLD: 0.09 K/UL (ref 0–0.12)
BILIRUB SERPL-MCNC: 0.7 MG/DL (ref 0.1–1.5)
BUN SERPL-MCNC: 11 MG/DL (ref 8–22)
CALCIUM ALBUM COR SERPL-MCNC: 9.4 MG/DL (ref 8.5–10.5)
CALCIUM SERPL-MCNC: 9.6 MG/DL (ref 8.5–10.5)
CHLORIDE SERPL-SCNC: 100 MMOL/L (ref 96–112)
CO2 SERPL-SCNC: 26 MMOL/L (ref 20–33)
CREAT SERPL-MCNC: 0.51 MG/DL (ref 0.5–1.4)
EKG IMPRESSION: NORMAL
EOSINOPHIL # BLD AUTO: 0.57 K/UL (ref 0–0.51)
EOSINOPHIL NFR BLD: 5.3 % (ref 0–6.9)
ERYTHROCYTE [DISTWIDTH] IN BLOOD BY AUTOMATED COUNT: 41.9 FL (ref 35.9–50)
FLUAV RNA SPEC QL NAA+PROBE: NEGATIVE
FLUBV RNA SPEC QL NAA+PROBE: NEGATIVE
GFR SERPLBLD CREATININE-BSD FMLA CKD-EPI: 121 ML/MIN/1.73 M 2
GLOBULIN SER CALC-MCNC: 4.1 G/DL (ref 1.9–3.5)
GLUCOSE SERPL-MCNC: 89 MG/DL (ref 65–99)
HCG SERPL QL: NEGATIVE
HCT VFR BLD AUTO: 49.2 % (ref 37–47)
HGB BLD-MCNC: 16 G/DL (ref 12–16)
IMM GRANULOCYTES # BLD AUTO: 0.04 K/UL (ref 0–0.11)
IMM GRANULOCYTES NFR BLD AUTO: 0.4 % (ref 0–0.9)
LYMPHOCYTES # BLD AUTO: 1.67 K/UL (ref 1–4.8)
LYMPHOCYTES NFR BLD: 15.5 % (ref 22–41)
MCH RBC QN AUTO: 29.6 PG (ref 27–33)
MCHC RBC AUTO-ENTMCNC: 32.5 G/DL (ref 33.6–35)
MCV RBC AUTO: 90.9 FL (ref 81.4–97.8)
MONOCYTES # BLD AUTO: 0.69 K/UL (ref 0–0.85)
MONOCYTES NFR BLD AUTO: 6.4 % (ref 0–13.4)
NEUTROPHILS # BLD AUTO: 7.72 K/UL (ref 2–7.15)
NEUTROPHILS NFR BLD: 71.6 % (ref 44–72)
NRBC # BLD AUTO: 0 K/UL
NRBC BLD-RTO: 0 /100 WBC
PLATELET # BLD AUTO: 343 K/UL (ref 164–446)
PMV BLD AUTO: 9.3 FL (ref 9–12.9)
POTASSIUM SERPL-SCNC: 4.1 MMOL/L (ref 3.6–5.5)
PROT SERPL-MCNC: 8.4 G/DL (ref 6–8.2)
RBC # BLD AUTO: 5.41 M/UL (ref 4.2–5.4)
RSV RNA SPEC QL NAA+PROBE: NEGATIVE
SARS-COV-2 RNA RESP QL NAA+PROBE: NOTDETECTED
SODIUM SERPL-SCNC: 138 MMOL/L (ref 135–145)
SPECIMEN SOURCE: NORMAL
WBC # BLD AUTO: 10.8 K/UL (ref 4.8–10.8)

## 2023-05-09 PROCEDURE — 36415 COLL VENOUS BLD VENIPUNCTURE: CPT

## 2023-05-09 PROCEDURE — 700111 HCHG RX REV CODE 636 W/ 250 OVERRIDE (IP): Performed by: HOSPITALIST

## 2023-05-09 PROCEDURE — 700111 HCHG RX REV CODE 636 W/ 250 OVERRIDE (IP): Mod: UD | Performed by: EMERGENCY MEDICINE

## 2023-05-09 PROCEDURE — 99285 EMERGENCY DEPT VISIT HI MDM: CPT

## 2023-05-09 PROCEDURE — 96365 THER/PROPH/DIAG IV INF INIT: CPT

## 2023-05-09 PROCEDURE — 84703 CHORIONIC GONADOTROPIN ASSAY: CPT

## 2023-05-09 PROCEDURE — 94640 AIRWAY INHALATION TREATMENT: CPT

## 2023-05-09 PROCEDURE — A9270 NON-COVERED ITEM OR SERVICE: HCPCS | Mod: UD | Performed by: EMERGENCY MEDICINE

## 2023-05-09 PROCEDURE — 93005 ELECTROCARDIOGRAM TRACING: CPT | Performed by: EMERGENCY MEDICINE

## 2023-05-09 PROCEDURE — 770001 HCHG ROOM/CARE - MED/SURG/GYN PRIV*

## 2023-05-09 PROCEDURE — 700101 HCHG RX REV CODE 250: Performed by: HOSPITALIST

## 2023-05-09 PROCEDURE — A9270 NON-COVERED ITEM OR SERVICE: HCPCS | Performed by: HOSPITALIST

## 2023-05-09 PROCEDURE — 700102 HCHG RX REV CODE 250 W/ 637 OVERRIDE(OP): Mod: UD | Performed by: EMERGENCY MEDICINE

## 2023-05-09 PROCEDURE — 85025 COMPLETE CBC W/AUTO DIFF WBC: CPT

## 2023-05-09 PROCEDURE — C9803 HOPD COVID-19 SPEC COLLECT: HCPCS | Performed by: EMERGENCY MEDICINE

## 2023-05-09 PROCEDURE — 93005 ELECTROCARDIOGRAM TRACING: CPT

## 2023-05-09 PROCEDURE — 80053 COMPREHEN METABOLIC PANEL: CPT

## 2023-05-09 PROCEDURE — 700102 HCHG RX REV CODE 250 W/ 637 OVERRIDE(OP): Performed by: HOSPITALIST

## 2023-05-09 PROCEDURE — 0241U HCHG SARS-COV-2 COVID-19 NFCT DS RESP RNA 4 TRGT MIC: CPT

## 2023-05-09 PROCEDURE — 96375 TX/PRO/DX INJ NEW DRUG ADDON: CPT

## 2023-05-09 PROCEDURE — 700101 HCHG RX REV CODE 250: Mod: UD | Performed by: EMERGENCY MEDICINE

## 2023-05-09 PROCEDURE — 71045 X-RAY EXAM CHEST 1 VIEW: CPT

## 2023-05-09 PROCEDURE — 99223 1ST HOSP IP/OBS HIGH 75: CPT | Performed by: HOSPITALIST

## 2023-05-09 RX ORDER — GUAIFENESIN 600 MG/1
600 TABLET, EXTENDED RELEASE ORAL EVERY 12 HOURS
Status: DISCONTINUED | OUTPATIENT
Start: 2023-05-09 | End: 2023-05-11 | Stop reason: HOSPADM

## 2023-05-09 RX ORDER — IPRATROPIUM BROMIDE AND ALBUTEROL SULFATE 2.5; .5 MG/3ML; MG/3ML
6 SOLUTION RESPIRATORY (INHALATION)
Status: ACTIVE | OUTPATIENT
Start: 2023-05-09 | End: 2023-05-10

## 2023-05-09 RX ORDER — PROMETHAZINE HYDROCHLORIDE 25 MG/1
12.5-25 TABLET ORAL EVERY 4 HOURS PRN
Status: DISCONTINUED | OUTPATIENT
Start: 2023-05-09 | End: 2023-05-11 | Stop reason: HOSPADM

## 2023-05-09 RX ORDER — MAGNESIUM SULFATE HEPTAHYDRATE 40 MG/ML
2 INJECTION, SOLUTION INTRAVENOUS ONCE
Status: COMPLETED | OUTPATIENT
Start: 2023-05-09 | End: 2023-05-10

## 2023-05-09 RX ORDER — DEXAMETHASONE SODIUM PHOSPHATE 10 MG/ML
10 INJECTION, SOLUTION INTRAMUSCULAR; INTRAVENOUS ONCE
Status: COMPLETED | OUTPATIENT
Start: 2023-05-09 | End: 2023-05-09

## 2023-05-09 RX ORDER — ONDANSETRON 2 MG/ML
4 INJECTION INTRAMUSCULAR; INTRAVENOUS EVERY 4 HOURS PRN
Status: DISCONTINUED | OUTPATIENT
Start: 2023-05-09 | End: 2023-05-11 | Stop reason: HOSPADM

## 2023-05-09 RX ORDER — FLUTICASONE PROPIONATE 44 UG/1
2 AEROSOL, METERED RESPIRATORY (INHALATION)
Status: DISCONTINUED | OUTPATIENT
Start: 2023-05-09 | End: 2023-05-10

## 2023-05-09 RX ORDER — ONDANSETRON 4 MG/1
4 TABLET, ORALLY DISINTEGRATING ORAL EVERY 4 HOURS PRN
Status: DISCONTINUED | OUTPATIENT
Start: 2023-05-09 | End: 2023-05-11 | Stop reason: HOSPADM

## 2023-05-09 RX ORDER — METHYLPREDNISOLONE SODIUM SUCCINATE 40 MG/ML
40 INJECTION, POWDER, LYOPHILIZED, FOR SOLUTION INTRAMUSCULAR; INTRAVENOUS EVERY 12 HOURS
Status: DISCONTINUED | OUTPATIENT
Start: 2023-05-10 | End: 2023-05-11 | Stop reason: HOSPADM

## 2023-05-09 RX ORDER — GUAIFENESIN/DEXTROMETHORPHAN 100-10MG/5
10 SYRUP ORAL EVERY 6 HOURS PRN
Status: DISCONTINUED | OUTPATIENT
Start: 2023-05-09 | End: 2023-05-11 | Stop reason: HOSPADM

## 2023-05-09 RX ORDER — METHYLPREDNISOLONE SODIUM SUCCINATE 40 MG/ML
40 INJECTION, POWDER, LYOPHILIZED, FOR SOLUTION INTRAMUSCULAR; INTRAVENOUS ONCE
Status: COMPLETED | OUTPATIENT
Start: 2023-05-09 | End: 2023-05-09

## 2023-05-09 RX ORDER — ALBUTEROL SULFATE 90 UG/1
4 AEROSOL, METERED RESPIRATORY (INHALATION) ONCE
Status: COMPLETED | OUTPATIENT
Start: 2023-05-09 | End: 2023-05-09

## 2023-05-09 RX ORDER — IPRATROPIUM BROMIDE AND ALBUTEROL SULFATE 2.5; .5 MG/3ML; MG/3ML
3 SOLUTION RESPIRATORY (INHALATION)
Status: DISCONTINUED | OUTPATIENT
Start: 2023-05-09 | End: 2023-05-10

## 2023-05-09 RX ORDER — PROCHLORPERAZINE EDISYLATE 5 MG/ML
5-10 INJECTION INTRAMUSCULAR; INTRAVENOUS EVERY 4 HOURS PRN
Status: DISCONTINUED | OUTPATIENT
Start: 2023-05-09 | End: 2023-05-11 | Stop reason: HOSPADM

## 2023-05-09 RX ORDER — PROMETHAZINE HYDROCHLORIDE 25 MG/1
12.5-25 SUPPOSITORY RECTAL EVERY 4 HOURS PRN
Status: DISCONTINUED | OUTPATIENT
Start: 2023-05-09 | End: 2023-05-11 | Stop reason: HOSPADM

## 2023-05-09 RX ORDER — IPRATROPIUM BROMIDE AND ALBUTEROL SULFATE 2.5; .5 MG/3ML; MG/3ML
3 SOLUTION RESPIRATORY (INHALATION)
Status: COMPLETED | OUTPATIENT
Start: 2023-05-09 | End: 2023-05-09

## 2023-05-09 RX ADMIN — MAGNESIUM SULFATE HEPTAHYDRATE 2 G: 2 INJECTION, SOLUTION INTRAVENOUS at 23:33

## 2023-05-09 RX ADMIN — IPRATROPIUM BROMIDE AND ALBUTEROL SULFATE 3 ML: 2.5; .5 SOLUTION RESPIRATORY (INHALATION) at 23:35

## 2023-05-09 RX ADMIN — METHYLPREDNISOLONE SODIUM SUCCINATE 40 MG: 40 INJECTION, POWDER, FOR SOLUTION INTRAMUSCULAR; INTRAVENOUS at 22:35

## 2023-05-09 RX ADMIN — GUAIFENESIN 600 MG: 600 TABLET, EXTENDED RELEASE ORAL at 23:33

## 2023-05-09 RX ADMIN — ALBUTEROL SULFATE 4 PUFF: 90 AEROSOL, METERED RESPIRATORY (INHALATION) at 21:04

## 2023-05-09 RX ADMIN — IPRATROPIUM BROMIDE AND ALBUTEROL SULFATE 3 ML: 2.5; .5 SOLUTION RESPIRATORY (INHALATION) at 18:40

## 2023-05-09 RX ADMIN — DEXAMETHASONE SODIUM PHOSPHATE 10 MG: 10 INJECTION INTRAMUSCULAR; INTRAVENOUS at 18:21

## 2023-05-09 ASSESSMENT — ENCOUNTER SYMPTOMS
BLURRED VISION: 0
SORE THROAT: 0
EYE PAIN: 0
STRIDOR: 0
PHOTOPHOBIA: 0
NECK PAIN: 0
DIAPHORESIS: 0
TREMORS: 0
WHEEZING: 1
DOUBLE VISION: 0
ABDOMINAL PAIN: 0
FEVER: 0
DIZZINESS: 0
MYALGIAS: 0
BACK PAIN: 0
ORTHOPNEA: 0
POLYDIPSIA: 0
HALLUCINATIONS: 0
NAUSEA: 0
BLOOD IN STOOL: 0
SHORTNESS OF BREATH: 1
DIARRHEA: 0
CLAUDICATION: 0
COUGH: 1
CHILLS: 0
SINUS PAIN: 0
DEPRESSION: 0
WEAKNESS: 0
SPUTUM PRODUCTION: 0
BRUISES/BLEEDS EASILY: 0
TINGLING: 0
HEMOPTYSIS: 0
FLANK PAIN: 0
HEARTBURN: 0
PND: 0
CONSTIPATION: 0
HEADACHES: 0
PALPITATIONS: 0
VOMITING: 0
FALLS: 0

## 2023-05-09 ASSESSMENT — COPD QUESTIONNAIRES
DO YOU EVER COUGH UP ANY MUCUS OR PHLEGM?: NO/ONLY WITH OCCASIONAL COLDS OR INFECTIONS
HAVE YOU SMOKED AT LEAST 100 CIGARETTES IN YOUR ENTIRE LIFE: NO/DON'T KNOW
COPD SCREENING SCORE: 0
DURING THE PAST 4 WEEKS HOW MUCH DID YOU FEEL SHORT OF BREATH: NONE/LITTLE OF THE TIME

## 2023-05-09 ASSESSMENT — LIFESTYLE VARIABLES: SUBSTANCE_ABUSE: 0

## 2023-05-09 NOTE — ED TRIAGE NOTES
Yaquelin Mccray  38 y.o. female  Chief Complaint   Patient presents with    Shortness of Breath     2x weeks. + productive cough    Back Pain     Pain with inspiration, right sided mid back pain        Vitals:    05/09/23 1555   BP: 125/86   Pulse: (!) 118   Resp: 18   Temp: 37.1 °C (98.8 °F)   SpO2: 92%       Patient educated on triage process and encouraged to alert staff of any changes in condition.

## 2023-05-10 LAB
ALBUMIN SERPL BCP-MCNC: 3.8 G/DL (ref 3.2–4.9)
ALBUMIN/GLOB SERPL: 1 G/DL
ALP SERPL-CCNC: 126 U/L (ref 30–99)
ALT SERPL-CCNC: 89 U/L (ref 2–50)
ANION GAP SERPL CALC-SCNC: 12 MMOL/L (ref 7–16)
AST SERPL-CCNC: 53 U/L (ref 12–45)
BASOPHILS # BLD AUTO: 0.3 % (ref 0–1.8)
BASOPHILS # BLD: 0.02 K/UL (ref 0–0.12)
BILIRUB SERPL-MCNC: 0.5 MG/DL (ref 0.1–1.5)
BUN SERPL-MCNC: 12 MG/DL (ref 8–22)
CALCIUM ALBUM COR SERPL-MCNC: 9.4 MG/DL (ref 8.5–10.5)
CALCIUM SERPL-MCNC: 9.2 MG/DL (ref 8.5–10.5)
CHLORIDE SERPL-SCNC: 101 MMOL/L (ref 96–112)
CO2 SERPL-SCNC: 23 MMOL/L (ref 20–33)
CREAT SERPL-MCNC: 0.66 MG/DL (ref 0.5–1.4)
EOSINOPHIL # BLD AUTO: 0 K/UL (ref 0–0.51)
EOSINOPHIL NFR BLD: 0 % (ref 0–6.9)
ERYTHROCYTE [DISTWIDTH] IN BLOOD BY AUTOMATED COUNT: 41.5 FL (ref 35.9–50)
GFR SERPLBLD CREATININE-BSD FMLA CKD-EPI: 114 ML/MIN/1.73 M 2
GLOBULIN SER CALC-MCNC: 4 G/DL (ref 1.9–3.5)
GLUCOSE SERPL-MCNC: 196 MG/DL (ref 65–99)
HCT VFR BLD AUTO: 45 % (ref 37–47)
HGB BLD-MCNC: 14.6 G/DL (ref 12–16)
IMM GRANULOCYTES # BLD AUTO: 0.03 K/UL (ref 0–0.11)
IMM GRANULOCYTES NFR BLD AUTO: 0.4 % (ref 0–0.9)
LYMPHOCYTES # BLD AUTO: 0.56 K/UL (ref 1–4.8)
LYMPHOCYTES NFR BLD: 8.1 % (ref 22–41)
MCH RBC QN AUTO: 29.1 PG (ref 27–33)
MCHC RBC AUTO-ENTMCNC: 32.4 G/DL (ref 33.6–35)
MCV RBC AUTO: 89.6 FL (ref 81.4–97.8)
MONOCYTES # BLD AUTO: 0.05 K/UL (ref 0–0.85)
MONOCYTES NFR BLD AUTO: 0.7 % (ref 0–13.4)
NEUTROPHILS # BLD AUTO: 6.27 K/UL (ref 2–7.15)
NEUTROPHILS NFR BLD: 90.5 % (ref 44–72)
NRBC # BLD AUTO: 0 K/UL
NRBC BLD-RTO: 0 /100 WBC
PLATELET # BLD AUTO: 337 K/UL (ref 164–446)
PMV BLD AUTO: 9.1 FL (ref 9–12.9)
POTASSIUM SERPL-SCNC: 3.9 MMOL/L (ref 3.6–5.5)
PROT SERPL-MCNC: 7.8 G/DL (ref 6–8.2)
RBC # BLD AUTO: 5.02 M/UL (ref 4.2–5.4)
SODIUM SERPL-SCNC: 136 MMOL/L (ref 135–145)
WBC # BLD AUTO: 6.9 K/UL (ref 4.8–10.8)

## 2023-05-10 PROCEDURE — 96366 THER/PROPH/DIAG IV INF ADDON: CPT

## 2023-05-10 PROCEDURE — 700111 HCHG RX REV CODE 636 W/ 250 OVERRIDE (IP): Performed by: HOSPITALIST

## 2023-05-10 PROCEDURE — 36415 COLL VENOUS BLD VENIPUNCTURE: CPT

## 2023-05-10 PROCEDURE — 85025 COMPLETE CBC W/AUTO DIFF WBC: CPT

## 2023-05-10 PROCEDURE — 99232 SBSQ HOSP IP/OBS MODERATE 35: CPT

## 2023-05-10 PROCEDURE — A9270 NON-COVERED ITEM OR SERVICE: HCPCS | Performed by: HOSPITALIST

## 2023-05-10 PROCEDURE — 94640 AIRWAY INHALATION TREATMENT: CPT

## 2023-05-10 PROCEDURE — 80053 COMPREHEN METABOLIC PANEL: CPT

## 2023-05-10 PROCEDURE — 770006 HCHG ROOM/CARE - MED/SURG/GYN SEMI*

## 2023-05-10 PROCEDURE — 700102 HCHG RX REV CODE 250 W/ 637 OVERRIDE(OP): Performed by: HOSPITALIST

## 2023-05-10 PROCEDURE — 96376 TX/PRO/DX INJ SAME DRUG ADON: CPT

## 2023-05-10 PROCEDURE — 700101 HCHG RX REV CODE 250: Performed by: HOSPITALIST

## 2023-05-10 RX ORDER — FLUTICASONE PROPIONATE 44 UG/1
2 AEROSOL, METERED RESPIRATORY (INHALATION) EVERY 12 HOURS
Status: DISCONTINUED | OUTPATIENT
Start: 2023-05-11 | End: 2023-05-11 | Stop reason: HOSPADM

## 2023-05-10 RX ORDER — IPRATROPIUM BROMIDE AND ALBUTEROL SULFATE 2.5; .5 MG/3ML; MG/3ML
3 SOLUTION RESPIRATORY (INHALATION)
Status: DISCONTINUED | OUTPATIENT
Start: 2023-05-11 | End: 2023-05-11 | Stop reason: HOSPADM

## 2023-05-10 RX ORDER — IPRATROPIUM BROMIDE AND ALBUTEROL SULFATE 2.5; .5 MG/3ML; MG/3ML
3 SOLUTION RESPIRATORY (INHALATION)
Status: DISCONTINUED | OUTPATIENT
Start: 2023-05-10 | End: 2023-05-11 | Stop reason: HOSPADM

## 2023-05-10 RX ADMIN — GUAIFENESIN 600 MG: 600 TABLET, EXTENDED RELEASE ORAL at 06:08

## 2023-05-10 RX ADMIN — METHYLPREDNISOLONE SODIUM SUCCINATE 40 MG: 40 INJECTION, POWDER, FOR SOLUTION INTRAMUSCULAR; INTRAVENOUS at 18:12

## 2023-05-10 RX ADMIN — IPRATROPIUM BROMIDE AND ALBUTEROL SULFATE 3 ML: 2.5; .5 SOLUTION RESPIRATORY (INHALATION) at 14:34

## 2023-05-10 RX ADMIN — METHYLPREDNISOLONE SODIUM SUCCINATE 40 MG: 40 INJECTION, POWDER, FOR SOLUTION INTRAMUSCULAR; INTRAVENOUS at 06:07

## 2023-05-10 RX ADMIN — IPRATROPIUM BROMIDE AND ALBUTEROL SULFATE 3 ML: 2.5; .5 SOLUTION RESPIRATORY (INHALATION) at 10:24

## 2023-05-10 RX ADMIN — IPRATROPIUM BROMIDE AND ALBUTEROL SULFATE 3 ML: 2.5; .5 SOLUTION RESPIRATORY (INHALATION) at 07:07

## 2023-05-10 RX ADMIN — GUAIFENESIN 600 MG: 600 TABLET, EXTENDED RELEASE ORAL at 18:12

## 2023-05-10 RX ADMIN — IPRATROPIUM BROMIDE AND ALBUTEROL SULFATE 3 ML: 2.5; .5 SOLUTION RESPIRATORY (INHALATION) at 02:48

## 2023-05-10 ASSESSMENT — LIFESTYLE VARIABLES
TOTAL SCORE: 0
TOTAL SCORE: 0
DOES PATIENT WANT TO STOP DRINKING: NO
EVER HAD A DRINK FIRST THING IN THE MORNING TO STEADY YOUR NERVES TO GET RID OF A HANGOVER: NO
ON A TYPICAL DAY WHEN YOU DRINK ALCOHOL HOW MANY DRINKS DO YOU HAVE: 0
HOW MANY TIMES IN THE PAST YEAR HAVE YOU HAD 5 OR MORE DRINKS IN A DAY: 0
CONSUMPTION TOTAL: NEGATIVE
TOTAL SCORE: 0
HAVE YOU EVER FELT YOU SHOULD CUT DOWN ON YOUR DRINKING: NO
ALCOHOL_USE: NO
AVERAGE NUMBER OF DAYS PER WEEK YOU HAVE A DRINK CONTAINING ALCOHOL: 0
EVER FELT BAD OR GUILTY ABOUT YOUR DRINKING: NO
HAVE PEOPLE ANNOYED YOU BY CRITICIZING YOUR DRINKING: NO

## 2023-05-10 ASSESSMENT — ENCOUNTER SYMPTOMS
WHEEZING: 1
PALPITATIONS: 0
HEARTBURN: 0
NAUSEA: 0
DIARRHEA: 0
SHORTNESS OF BREATH: 1
FEVER: 0
HEADACHES: 0
DIZZINESS: 0
CHILLS: 0
VOMITING: 0
ABDOMINAL PAIN: 0
COUGH: 1

## 2023-05-10 ASSESSMENT — PAIN DESCRIPTION - PAIN TYPE
TYPE: ACUTE PAIN
TYPE: ACUTE PAIN

## 2023-05-10 ASSESSMENT — PATIENT HEALTH QUESTIONNAIRE - PHQ9
2. FEELING DOWN, DEPRESSED, IRRITABLE, OR HOPELESS: NOT AT ALL
SUM OF ALL RESPONSES TO PHQ9 QUESTIONS 1 AND 2: 0
1. LITTLE INTEREST OR PLEASURE IN DOING THINGS: NOT AT ALL

## 2023-05-10 ASSESSMENT — FIBROSIS 4 INDEX
FIB4 SCORE: 0.67
FIB4 SCORE: 0.67

## 2023-05-10 NOTE — H&P
Hospital Medicine History & Physical Note    Date of Service  5/9/2023    Primary Care Physician  Pcp Pt States None    Consultants      Specialist Names:     Code Status  No Order    Chief Complaint  Chief Complaint   Patient presents with    Shortness of Breath     2x weeks. + productive cough    Back Pain     Pain with inspiration, right sided mid back pain        History of Presenting Illness  Yaquelin Mccray is a 38 y.o. female who presented 5/9/2023 with past medical history of asthma who comes into the hospital for shortness of breath and cough for 15 days.  It is associated with wheezing.  She states that the cough has whitish and clear sputum.  She denies any fever, chills, nausea, vomiting, diarrhea.  She tried taking her rescue inhaler but it was ineffective in treating her symptoms.  She has frequent nighttime awakenings for the past 2 weeks.  Her last asthma attack was 3 years ago but she is never intubated.  She denies smoking cigarettes.  Patient denies any recent new pets.    Chest x-ray interpreted by me found no acute pulmonary process    I discussed the plan of care with patient.    Review of Systems  Review of Systems   Constitutional:  Negative for chills, diaphoresis, fever and malaise/fatigue.   HENT:  Negative for congestion, ear discharge, ear pain, hearing loss, nosebleeds, sinus pain, sore throat and tinnitus.    Eyes:  Negative for blurred vision, double vision, photophobia and pain.   Respiratory:  Positive for cough, shortness of breath and wheezing. Negative for hemoptysis, sputum production and stridor.    Cardiovascular:  Negative for chest pain, palpitations, orthopnea, claudication, leg swelling and PND.   Gastrointestinal:  Negative for abdominal pain, blood in stool, constipation, diarrhea, heartburn, melena, nausea and vomiting.   Genitourinary:  Negative for dysuria, flank pain, frequency, hematuria and urgency.   Musculoskeletal:  Negative for back pain,  falls, joint pain, myalgias and neck pain.   Skin:  Negative for itching and rash.   Neurological:  Negative for dizziness, tingling, tremors, weakness and headaches.   Endo/Heme/Allergies:  Negative for environmental allergies and polydipsia. Does not bruise/bleed easily.   Psychiatric/Behavioral:  Negative for depression, hallucinations, substance abuse and suicidal ideas.      Past Medical History  Medical history reviewed and not pertinent    Surgical History   has a past surgical history that includes primary c section.     Family History  family history includes No Known Problems in her father and mother.   Family history reviewed with patient. There is no family history that is pertinent to the chief complaint.     Social History   reports that she has never smoked. She has never used smokeless tobacco. She reports that she does not drink alcohol and does not use drugs.    Allergies  No Known Allergies    Medications  Prior to Admission Medications   Prescriptions Last Dose Informant Patient Reported? Taking?   albuterol 108 (90 Base) MCG/ACT Aero Soln inhalation aerosol   No No   Sig: Inhale 2 Puffs every 6 hours as needed for Shortness of Breath.   predniSONE (DELTASONE) 20 MG Tab   No No   Sig: Take 2 tabs once daily for 4 days      Facility-Administered Medications: None       Physical Exam  Temp:  [37.1 °C (98.8 °F)] 37.1 °C (98.8 °F)  Pulse:  [] 116  Resp:  [14-26] 26  BP: (107-125)/(66-86) 107/68  SpO2:  [86 %-98 %] 86 %  Blood Pressure: 107/68   Temperature: 37.1 °C (98.8 °F)   Pulse: (!) 116   Respiration: (!) 26   Pulse Oximetry: (!) 86 %       Physical Exam  Vitals and nursing note reviewed.   Constitutional:       General: She is not in acute distress.     Appearance: Normal appearance. She is not ill-appearing, toxic-appearing or diaphoretic.   HENT:      Head: Normocephalic and atraumatic.      Nose: No congestion or rhinorrhea.      Mouth/Throat:      Pharynx: No oropharyngeal exudate or  posterior oropharyngeal erythema.   Eyes:      General: No scleral icterus.  Neck:      Vascular: No carotid bruit or JVD.   Cardiovascular:      Rate and Rhythm: Normal rate and regular rhythm.      Pulses: Normal pulses.      Heart sounds: Normal heart sounds. No murmur heard.    No friction rub. No gallop.   Pulmonary:      Effort: Pulmonary effort is normal. No respiratory distress.      Breath sounds: No stridor. Wheezing present. No rhonchi or rales.   Abdominal:      General: Abdomen is flat. There is no distension.      Palpations: There is no mass.      Tenderness: There is no abdominal tenderness. There is no left CVA tenderness, guarding or rebound.      Hernia: No hernia is present.   Musculoskeletal:         General: No swelling. Normal range of motion.      Cervical back: No rigidity. No muscular tenderness.      Right lower leg: No edema.      Left lower leg: No edema.   Lymphadenopathy:      Cervical: No cervical adenopathy.   Skin:     General: Skin is warm and dry.      Capillary Refill: Capillary refill takes more than 3 seconds.      Coloration: Skin is not jaundiced or pale.      Findings: No bruising or erythema.   Neurological:      Mental Status: She is alert.       Laboratory:  Recent Labs     05/09/23  1618   WBC 10.8   RBC 5.41*   HEMOGLOBIN 16.0   HEMATOCRIT 49.2*   MCV 90.9   MCH 29.6   MCHC 32.5*   RDW 41.9   PLATELETCT 343   MPV 9.3     Recent Labs     05/09/23  1618   SODIUM 138   POTASSIUM 4.1   CHLORIDE 100   CO2 26   GLUCOSE 89   BUN 11   CREATININE 0.51   CALCIUM 9.6     Recent Labs     05/09/23  1618   ALTSGPT 99*   ASTSGOT 76*   ALKPHOSPHAT 139*   TBILIRUBIN 0.7   GLUCOSE 89         No results for input(s): NTPROBNP in the last 72 hours.      No results for input(s): TROPONINT in the last 72 hours.    Imaging:  DX-CHEST-PORTABLE (1 VIEW)   Final Result      No acute cardiopulmonary abnormality identified.          X-Ray:  I have personally reviewed the images and compared with  prior images.    Assessment/Plan:  Justification for Admission Status  I anticipate this patient will require at least two midnights for appropriate medical management, necessitating inpatient admission because asthma exacerbation    Patient will need a Med/Surg bed on MEDICAL service .  The need is secondary to asthma exacerbation.    * Acute asthma exacerbation- (present on admission)  Assessment & Plan  Frequent nighttime awakenings and 15 days of symptoms  DuoNeb inhalers every 4 hours  IV steroids  Start inhaled steroids  Outpatient pulmonary follow-up    Elevated liver enzymes  Assessment & Plan  Continue to trend  Avoid hepatotoxic medications    Acute respiratory failure with hypoxia (HCC)  Assessment & Plan  On 2 L of O2 above baseline  Continue to wean off as necessary        VTE prophylaxis: SCDs/TEDs

## 2023-05-10 NOTE — CARE PLAN
The patient is Watcher - Medium risk of patient condition declining or worsening    Shift Goals  Clinical Goals: respiratory care  Patient Goals: breath better    Progress made toward(s) clinical / shift goals:      Patient is not progressing towards the following goals:      Problem: Knowledge Deficit - Standard  Goal: Patient and family/care givers will demonstrate understanding of plan of care, disease process/condition, diagnostic tests and medications  5/10/2023 1455 by iNcole Gomez R.N.  Outcome: Progressing  Note: Patient on 3L NC at home is room air is her baseline. Patient receiving nebulizer treatment as scheduled.

## 2023-05-10 NOTE — CARE PLAN
Problem: Bronchoconstriction  Goal: Improve in air movement and diminished wheezing  Description: Target End Date:  2 to 3 days    1.  Implement inhaled treatments  2.  Evaluate and manage medication effects  Outcome: Progressing  Flowsheets (Taken 5/9/2023 2339 by Shilo Hansen, VALE)  Bronchodilator Goals/Outcome: Diminished Wheezing and Volume of Air Movement Increased  Bronchodilator Indications: Physical Exam / Hyperinflation / Wheezing (bronchospasm)       Respiratory Update    Treatment modality: DuoNeb  Frequency: Q4    Pt tolerating current treatments well with no adverse reactions.

## 2023-05-10 NOTE — ED NOTES
Bedside report received by YESY Kaplan. pt resting on [facility]. Cardiac monitor, auto NIBP and Pulse ox in place. Appears comfortable, no acute signs of distress present.

## 2023-05-10 NOTE — ED NOTES
Bedside report from YESY Herr    ABCs intact. Pt on 3LPM attached to wall O2. A+Ox4. Skin PWD. Vitals on the monitor.

## 2023-05-10 NOTE — DISCHARGE INSTRUCTIONS
Asthma Attack Prevention, Adult  Although you may not be able to control the fact that you have asthma, you can take actions to prevent episodes of asthma (asthma attacks). These actions include:  Creating a written plan for managing and treating your asthma attacks (asthma action plan).  Monitoring your asthma.  Avoiding things that can irritate your airways or make your asthma symptoms worse (asthma triggers).  Taking your medicines as directed.  Acting quickly if you have signs or symptoms of an asthma attack.  What are some ways to prevent an asthma attack?  Create a plan  Work with your health care provider to create an asthma action plan. This plan should include:  A list of your asthma triggers and how to avoid them.  A list of symptoms that you experience during an asthma attack.  Information about when to take medicine and how much medicine to take.  Information to help you understand your peak flow measurements.  Contact information for your health care providers.  Daily actions that you can take to control asthma.  Monitor your asthma  To monitor your asthma:  Use your peak flow meter every morning and every evening for 2-3 weeks. Record the results in a journal. A drop in your peak flow numbers on one or more days may mean that you are starting to have an asthma attack, even if you are not having symptoms.  When you have asthma symptoms, write them down in a journal.    Avoid asthma triggers  Work with your health care provider to find out what your asthma triggers are. This can be done by:  Being tested for allergies.  Keeping a journal that notes when asthma attacks occur and what may have contributed to them.  Asking your health care provider whether other medical conditions make your asthma worse.  Common asthma triggers include:  Dust.  Smoke. This includes campfire smoke and secondhand smoke from tobacco products.  Pet dander.  Trees, grasses or pollens.  Very cold, dry, or humid air.  Mold.  Foods  that contain high amounts of sulfites.  Strong smells.  Engine exhaust and air pollution.  Aerosol sprays and fumes from household .  Household pests and their droppings, including dust mites and cockroaches.  Certain medicines, including NSAIDs.  Once you have determined your asthma triggers, take steps to avoid them. Depending on your triggers, you may be able to reduce the chance of an asthma attack by:  Keeping your home clean. Have someone dust and vacuum your home for you 1 or 2 times a week. If possible, have them use a high-efficiency particulate arrestance (HEPA) vacuum.  Washing your sheets weekly in hot water.  Using allergy-proof mattress covers and casings on your bed.  Keeping pets out of your home.  Taking care of mold and water problems in your home.  Avoiding areas where people smoke.  Avoiding using strong perfumes or odor sprays.  Avoid spending a lot of time outdoors when pollen counts are high and on very windy days.  Talking with your health care provider before stopping or starting any new medicines.  Medicines  Take over-the-counter and prescription medicines only as told by your health care provider. Many asthma attacks can be prevented by carefully following your medicine schedule. Taking your medicines correctly is especially important when you cannot avoid certain asthma triggers. Even if you are doing well, do not stop taking your medicine and do not take less medicine.  Act quickly  If an asthma attack happens, acting quickly can decrease how severe it is and how long it lasts. Take these actions:  Pay attention to your symptoms. If you are coughing, wheezing, or having difficulty breathing, do not wait to see if your symptoms go away on their own. Follow your asthma action plan.  If you have followed your asthma action plan and your symptoms are not improving, call your health care provider or seek immediate medical care at the nearest hospital.  It is important to write down  how often you need to use your fast-acting rescue inhaler. You can track how often you use an inhaler in your journal. If you are using your rescue inhaler more often, it may mean that your asthma is not under control. Adjusting your asthma treatment plan may help you to prevent future asthma attacks and help you to gain better control of your condition.  How can I prevent an asthma attack when I exercise?  Exercise is a common asthma trigger. To prevent asthma attacks during exercise:  Follow advice from your health care provider about whether you should use your fast-acting inhaler before exercising. Many people with asthma experience exercise-induced bronchoconstriction (EIB). This condition often worsens during vigorous exercise in cold, humid, or dry environments. Usually, people with EIB can stay very active by using a fast-acting inhaler before exercising.  Avoid exercising outdoors in very cold or humid weather.  Avoid exercising outdoors when pollen counts are high.  Warm up and cool down when exercising.  Stop exercising right away if asthma symptoms start.  Consider taking part in exercises that are less likely to cause asthma symptoms such as:  Indoor swimming.  Biking.  Walking.  Hiking.  Playing football.  This information is not intended to replace advice given to you by your health care provider. Make sure you discuss any questions you have with your health care provider.  Document Released: 12/06/2010 Document Revised: 11/30/2018 Document Reviewed: 06/03/2017  Elsevier Patient Education © 2020 [employer]

## 2023-05-10 NOTE — ED NOTES
Med rec completed per patient  Allergies reviewed  No PO Antibiotics in the last 30 days     Patient denies taking any medications RX or OTC

## 2023-05-10 NOTE — ED NOTES
Morning labs collected and sent to lab. Pt resting, treatment completed by RT. On all monitoring, call light within reach. Bedside report handoff given to Parul HAYWARD.

## 2023-05-10 NOTE — ED NOTES
Pt ambulated to bathroom and back with portable O2 tank with a steady gait. Placed back on monitoring, pt medicated per MAR. Tolerated well. Pt reports that she is feeling much better than when she got here. Pt remains on 3L NC, connected to wall. Family at bedside, call light within reach, updated on POC.

## 2023-05-10 NOTE — ED NOTES
Bedside report received. Pt on 3L O2 NC wall O2. Denies needs. Waiting for bed assignment. On monitor, all alarms audible.

## 2023-05-10 NOTE — ED PROVIDER NOTES
"  ER Provider Note    Scribed for Emmett Mathews M.D. by Mala Bennett. 5/9/2023  5:36 PM    Primary Care Provider: Pcp Pt States None    CHIEF COMPLAINT  Chief Complaint   Patient presents with    Shortness of Breath     2x weeks. + productive cough    Back Pain     Pain with inspiration, right sided mid back pain      EXTERNAL RECORDS REVIEWED  Outpatient Notes most recent outpatient note 5/2/2023.  Patient seen for multiple joint pain, maintenance inhaler refilled    HPI/ROS  LIMITATION TO HISTORY   Select: : None  OUTSIDE HISTORIAN(S):  Family Daughter and Son are at bedside and help to translate to [place].     Yaquelin Mccray is a 38 y.o. female who presents to the ED complaining of shortness of breath onset 2 weeks ago. Patient has associated symptoms of a cough, headache, and right middle back pain, but denies nausea or vomiting. No alleviating or exacerbating factors were reported.  She denies any chest pain, abdominal pain.  Denies any fevers.  She denies any hemoptysis or leg swelling.    PAST MEDICAL HISTORY  History reviewed. No pertinent past medical history.    SURGICAL HISTORY  Past Surgical History:   Procedure Laterality Date    PRIMARY C SECTION         FAMILY HISTORY  Family History   Problem Relation Age of Onset    No Known Problems Mother     No Known Problems Father        SOCIAL HISTORY   reports that she has never smoked. She has never used smokeless tobacco. She reports that she does not drink alcohol and does not use drugs.    CURRENT MEDICATIONS  Previous Medications    No medications on file       ALLERGIES  Patient has no known allergies.    PHYSICAL EXAM  /86   Pulse (!) 118   Temp 37.1 °C (98.8 °F) (Temporal)   Resp 18   Ht 1.575 m (5' 2\")   Wt 71.7 kg (158 lb 1.1 oz)   SpO2 92%   BMI 28.91 kg/m²     Gen: Alert, no acute distress  HEENT: ATNC  Neck: trachea midline  Resp: Diffused wheezing, no crackles or rales  CV: Normal rate, No murmurs  Abd: " non-distended, nontender  Ext: No deformities, No pedal edema, no calf tenderness.   Psych: normal mood  Neuro: speech fluent GCS 15, moves all extremities    DIAGNOSTIC STUDIES    Labs:   Labs Reviewed   CBC WITH DIFFERENTIAL - Abnormal; Notable for the following components:       Result Value    RBC 5.41 (*)     Hematocrit 49.2 (*)     MCHC 32.5 (*)     Lymphocytes 15.50 (*)     Neutrophils (Absolute) 7.72 (*)     Eos (Absolute) 0.57 (*)     All other components within normal limits   COMP METABOLIC PANEL - Abnormal; Notable for the following components:    AST(SGOT) 76 (*)     ALT(SGPT) 99 (*)     Alkaline Phosphatase 139 (*)     Total Protein 8.4 (*)     Globulin 4.1 (*)     All other components within normal limits   CORRECTED CALCIUM   ESTIMATED GFR   HCG QUAL SERUM   COV-2, FLU A/B, AND RSV BY PCR ([facility])   CBC WITH DIFFERENTIAL   COMP METABOLIC PANEL        EKG:   I have independently interpreted this EKG  Results for orders placed or performed during the hospital encounter of 23   EKG   Result Value Ref Range    Report       [facility] Emergency Dept.    Test Date:  2023  Pt Name:    NORA GLASS Department: ER  MRN:        [id number]                      Room:  Gender:     Female                       Technician: [id number]  :        [id number]                   Requested By:ER TRIAGE PROTOCOL  Order #:    [id number]                    Reading MD: Emmett Mathews    Measurements  Intervals                                Axis  Rate:       119                          P:          59  IN:         141                          QRS:        -47  QRSD:       73                           T:          60  QT:         298  QTc:        420    Interpretive Statements  Sinus tachycardia  Left anterior fascicular block  No previous ECG available for comparison  Electronically Signed On 2023 17:34:32 PDT by Emmett Mathews           Radiology:   The attending emergency physician has  independently interpreted the diagnostic imaging associated with this visit and am waiting the final reading from the radiologist.   Preliminary interpretation is a follows: Chest x-ray: No pneumonia  Radiologist interpretation:   DX-CHEST-PORTABLE (1 VIEW)   Final Result      No acute cardiopulmonary abnormality identified.         COURSE & MEDICAL DECISION MAKING     ED Observation Status? Yes; I am placing the patient in to an observation status due to a diagnostic uncertainty as well as therapeutic intensity. Patient placed in observation status at 5:43 PM, 5/9/2023.     Observation plan is as follows: Imaging and Labs    Upon Reevaluation, the patient's condition has: not improved; and will be escalated to hospitalization.    Patient discharged from ED Observation status at 9:42 PM (Time) (5/9/2023)  (Date).     5:40 PM - Patient seen and examined at bedside. Discussed plan of care, including labs and imaging. Patient agrees to the plan of care. The patient will be medicated with Decadron injection 10 mg and albuterol injection.     7:35 PM - Patient was reevaluated at bedside. Discussed lab and radiology results with the patient. She is still wheezing, so she will be administered an additional albuterol treatment.    9:42 PM - I reevaluated the patient at bedside. I informed the patient of my plan to admit today given the patient's current presentation and diagnostic study results. Patient verbalizes understanding and support with my plan for admission.       The total critical care time on this patient is 31 minutes, resuscitating patient, speaking with admitting physician, and deciphering test results. This 31 minutes is exclusive of separately billable procedures.      INITIAL ASSESSMENT, COURSE AND PLAN  Care Narrative: Patient presents with ongoing shortness of breath.  She has diffuse wheezing on lung auscultation despite home inhaler use, however she does not have a spacer to use this with.  She was  given DuoNeb treatment with some improvement in her subjective symptoms, however continued to have ongoing wheezing.  I trialed an additional albuterol treatment with spacer in case the patient was able to take his home, however she had continued desaturations after this.  She has no stigmata of DVT/PE.  Her labs do demonstrate some slight LFT abnormalities but these are consistent with her prior labs.  No evidence of acute Cholecystitis, ascending cholangitis.    Given the patient's ongoing symptoms, she was ordered for additional nebulizer treatments for total of 3 stacked DuoNebs as well as albuterol      DISPOSITION AND DISCUSSIONS  I have discussed management of the patient with the following physicians and LUISANA's:  Dr. Pyle (Hospitalist)    Discussion of management with other hospitals or appropriate source(s): None       DISPOSITION:  Patient will be hospitalized by Dr. Pyle in guarded condition.      FINAL DIANGOSIS  1. Intermittent asthma with acute exacerbation, unspecified asthma severity    2. Acute hypoxemic respiratory failure (HCC)      IMala (Nicole), am scribing for, and in the presence of, No att. providers found.    Electronically signed by: Mala Bennett (Nicole), 5/9/2023        CRITICAL CARE TIME 31 minutes  There was a very real possibility of deterioration of the patient's condition.  This patient required the highest level of care.  I provided critical care services which included: review of the medical record, treatment orders, ordering and reviewing test results, frequent reevaluation of the patient's condition and response to treatment, as well as discussing the case with appropriate personnel and various consultants. The critical care time associated with the care of this patient is exclusive of any procedures or specific interventions.      IEmmett M.D. personally performed the services described in this documentation, as scribed by Mala Bennett in my presence, and it is  both accurate and complete.      The note accurately reflects work and decisions made by me.  Emmett Mathews M.D.  5/9/2023  11:51 PM

## 2023-05-10 NOTE — HOSPITAL COURSE
Yaquelin Neville Mccray is a 38 y.o. female who presented 5/9/2023 with past medical history of asthma who comes into the hospital for shortness of breath and cough for 15 days.  It is associated with wheezing.  She states that the cough has whitish and clear sputum.  She tried taking her rescue inhaler but it was ineffective in treating her symptoms.  She has frequent nighttime awakenings for the past 2 weeks.  Her last asthma attack was 3 years ago but she has never been intubated.  She denies smoking cigarettes.  Patient denies any recent new pets.     Chest x-ray found no acute pulmonary process.  Viral panel is negative.

## 2023-05-10 NOTE — ASSESSMENT & PLAN NOTE
Frequent nighttime awakenings and 15 days of symptoms  DuoNeb inhalers every 4 hours  IV steroids  Start inhaled steroids  Outpatient pulmonary follow-up

## 2023-05-11 ENCOUNTER — PHARMACY VISIT (OUTPATIENT)
Dept: PHARMACY | Facility: MEDICAL CENTER | Age: 38
End: 2023-05-11
Payer: MEDICARE

## 2023-05-11 VITALS
BODY MASS INDEX: 29.09 KG/M2 | WEIGHT: 158.07 LBS | RESPIRATION RATE: 18 BRPM | DIASTOLIC BLOOD PRESSURE: 74 MMHG | OXYGEN SATURATION: 97 % | SYSTOLIC BLOOD PRESSURE: 114 MMHG | TEMPERATURE: 98.1 F | HEART RATE: 77 BPM | HEIGHT: 62 IN

## 2023-05-11 PROBLEM — J96.01 ACUTE RESPIRATORY FAILURE WITH HYPOXIA (HCC): Status: RESOLVED | Noted: 2023-05-09 | Resolved: 2023-05-11

## 2023-05-11 PROBLEM — R74.8 ELEVATED LIVER ENZYMES: Status: RESOLVED | Noted: 2023-05-09 | Resolved: 2023-05-11

## 2023-05-11 PROBLEM — J45.901 ACUTE ASTHMA EXACERBATION: Status: RESOLVED | Noted: 2023-05-09 | Resolved: 2023-05-11

## 2023-05-11 LAB
ALBUMIN SERPL BCP-MCNC: 3.9 G/DL (ref 3.2–4.9)
ALBUMIN/GLOB SERPL: 1.1 G/DL
ALP SERPL-CCNC: 118 U/L (ref 30–99)
ALT SERPL-CCNC: 82 U/L (ref 2–50)
ANION GAP SERPL CALC-SCNC: 13 MMOL/L (ref 7–16)
AST SERPL-CCNC: 44 U/L (ref 12–45)
BILIRUB SERPL-MCNC: 0.5 MG/DL (ref 0.1–1.5)
BUN SERPL-MCNC: 18 MG/DL (ref 8–22)
CALCIUM ALBUM COR SERPL-MCNC: 9.1 MG/DL (ref 8.5–10.5)
CALCIUM SERPL-MCNC: 9 MG/DL (ref 8.5–10.5)
CHLORIDE SERPL-SCNC: 104 MMOL/L (ref 96–112)
CO2 SERPL-SCNC: 23 MMOL/L (ref 20–33)
CREAT SERPL-MCNC: 0.54 MG/DL (ref 0.5–1.4)
GFR SERPLBLD CREATININE-BSD FMLA CKD-EPI: 119 ML/MIN/1.73 M 2
GLOBULIN SER CALC-MCNC: 3.5 G/DL (ref 1.9–3.5)
GLUCOSE SERPL-MCNC: 166 MG/DL (ref 65–99)
POTASSIUM SERPL-SCNC: 4.1 MMOL/L (ref 3.6–5.5)
PROT SERPL-MCNC: 7.4 G/DL (ref 6–8.2)
SODIUM SERPL-SCNC: 140 MMOL/L (ref 135–145)

## 2023-05-11 PROCEDURE — 700101 HCHG RX REV CODE 250

## 2023-05-11 PROCEDURE — 700102 HCHG RX REV CODE 250 W/ 637 OVERRIDE(OP)

## 2023-05-11 PROCEDURE — A9270 NON-COVERED ITEM OR SERVICE: HCPCS

## 2023-05-11 PROCEDURE — 99239 HOSP IP/OBS DSCHRG MGMT >30: CPT | Performed by: INTERNAL MEDICINE

## 2023-05-11 PROCEDURE — RXMED WILLOW AMBULATORY MEDICATION CHARGE: Performed by: INTERNAL MEDICINE

## 2023-05-11 PROCEDURE — A9270 NON-COVERED ITEM OR SERVICE: HCPCS | Performed by: HOSPITALIST

## 2023-05-11 PROCEDURE — 94640 AIRWAY INHALATION TREATMENT: CPT

## 2023-05-11 PROCEDURE — 94760 N-INVAS EAR/PLS OXIMETRY 1: CPT

## 2023-05-11 PROCEDURE — 80053 COMPREHEN METABOLIC PANEL: CPT

## 2023-05-11 PROCEDURE — 700102 HCHG RX REV CODE 250 W/ 637 OVERRIDE(OP): Performed by: HOSPITALIST

## 2023-05-11 PROCEDURE — 700111 HCHG RX REV CODE 636 W/ 250 OVERRIDE (IP): Performed by: HOSPITALIST

## 2023-05-11 RX ORDER — ALBUTEROL SULFATE 90 UG/1
2 AEROSOL, METERED RESPIRATORY (INHALATION) EVERY 6 HOURS PRN
Qty: 8.5 G | Refills: 1 | Status: SHIPPED | OUTPATIENT
Start: 2023-05-11

## 2023-05-11 RX ORDER — PREDNISONE 20 MG/1
40 TABLET ORAL DAILY
Qty: 6 TABLET | Refills: 0 | Status: SHIPPED | OUTPATIENT
Start: 2023-05-11 | End: 2023-05-14

## 2023-05-11 RX ORDER — FLUTICASONE PROPIONATE AND SALMETEROL 100; 50 UG/1; UG/1
1 POWDER RESPIRATORY (INHALATION) EVERY 12 HOURS
Qty: 60 EACH | Refills: 1 | Status: SHIPPED | OUTPATIENT
Start: 2023-05-11

## 2023-05-11 RX ADMIN — FLUTICASONE PROPIONATE 88 MCG: 44 AEROSOL, METERED RESPIRATORY (INHALATION) at 05:38

## 2023-05-11 RX ADMIN — GUAIFENESIN 600 MG: 600 TABLET, EXTENDED RELEASE ORAL at 05:38

## 2023-05-11 RX ADMIN — METHYLPREDNISOLONE SODIUM SUCCINATE 40 MG: 40 INJECTION, POWDER, FOR SOLUTION INTRAMUSCULAR; INTRAVENOUS at 05:42

## 2023-05-11 RX ADMIN — IPRATROPIUM BROMIDE AND ALBUTEROL SULFATE 3 ML: .5; 2.5 SOLUTION RESPIRATORY (INHALATION) at 07:00

## 2023-05-11 RX ADMIN — IPRATROPIUM BROMIDE AND ALBUTEROL SULFATE 3 ML: .5; 2.5 SOLUTION RESPIRATORY (INHALATION) at 11:00

## 2023-05-11 ASSESSMENT — PAIN DESCRIPTION - PAIN TYPE
TYPE: ACUTE PAIN

## 2023-05-11 NOTE — PROGRESS NOTES
D/c instructions given, educated on worsening s/s. Pt understands and questions answered. Home with son

## 2023-05-11 NOTE — DISCHARGE SUMMARY
Discharge Summary    CHIEF COMPLAINT ON ADMISSION  Chief Complaint   Patient presents with    Shortness of Breath     2x weeks. + productive cough    Back Pain     Pain with inspiration, right sided mid back pain        Reason for Admission  Shortness of breath      Admission Date  5/9/2023    CODE STATUS  Full Code    HPI & HOSPITAL COURSE    Yaquelin Mccray is a 38 y.o. female who presented 5/9/2023 with past medical history of asthma who comes into the hospital for shortness of breath and cough for 15 days.  Patient was noted to be hypoxic on admission and was started on 2 L of oxygen by nasal cannula.Chest x-ray found no acute pulmonary process.  Viral panel is negative.  Patient was started on IV Solu-Medrol, scheduled DuoNeb breathing treatments and RT protocol for acute exacerbation of asthma.  Over the next day her symptoms improved and she was discharged home on oral prednisone and instructed to follow-up with her PCP.  Patient was weaned off of oxygen and on the day of discharge her vitals remained stable on ambulation.    Therefore, she is discharged in good and stable condition to home with close outpatient follow-up.    The patient met 2-midnight criteria for an inpatient stay at the time of discharge.    Discharge Date  5/11/2023    FOLLOW UP ITEMS POST DISCHARGE  Follow-up with PCP    DISCHARGE DIAGNOSES  Principal Problem (Resolved):    Acute asthma exacerbation (POA: Yes)  Active Problems:    * No active hospital problems. *  Resolved Problems:    Acute respiratory failure with hypoxia (HCC) (POA: Yes)    Elevated liver enzymes (POA: Yes)      FOLLOW UP  No future appointments.  No follow-up provider specified.    MEDICATIONS ON DISCHARGE     Medication List        START taking these medications        Instructions   albuterol 108 (90 Base) MCG/ACT Aers inhalation aerosol   Inhale 2 Puffs every 6 hours as needed for Shortness of Breath.  Dose: 2 Puff     fluticasone-salmeterol  100-50 MCG/ACT Aepb  Commonly known as: Advair   Inhale 1 Puff every 12 hours.  Dose: 1 Puff     predniSONE 20 MG Tabs  Commonly known as: DELTASONE   Take 2 Tablets by mouth every day for 3 days.  Dose: 40 mg              Allergies  No Known Allergies    DIET  Orders Placed This Encounter   Procedures    Diet Order Diet: Regular     Standing Status:   Standing     Number of Occurrences:   1     Order Specific Question:   Diet:     Answer:   Regular [1]       ACTIVITY  As tolerated.  Weight bearing as tolerated    CONSULTATIONS  None    PROCEDURES  None    LABORATORY  Lab Results   Component Value Date    SODIUM 140 05/11/2023    POTASSIUM 4.1 05/11/2023    CHLORIDE 104 05/11/2023    CO2 23 05/11/2023    GLUCOSE 166 (H) 05/11/2023    BUN 18 05/11/2023    CREATININE 0.54 05/11/2023    CREATININE 0.6 10/11/2007        Lab Results   Component Value Date    WBC 6.9 05/10/2023    HEMOGLOBIN 14.6 05/10/2023    HEMATOCRIT 45.0 05/10/2023    PLATELETCT 337 05/10/2023        Total time of the discharge process exceeds 34 minutes.

## 2023-05-11 NOTE — CARE PLAN
The patient is Stable - Low risk of patient condition declining or worsening    Shift Goals  Problem: Knowledge Deficit - Standard  Goal: Patient and family/care givers will demonstrate understanding of plan of care, disease process/condition, diagnostic tests and medications  Outcome: Met     Clinical Goals: titrate O2 down  Patient Goals: use IS, get better  Family Goals: home    Progress made toward(s) clinical / shift goals:  Pt on room air maintaing O2 Saturation. Pt medically clear to discharge.    Patient is not progressing towards the following goals:

## 2023-05-11 NOTE — PROGRESS NOTES
4 Eyes Skin Assessment Completed by YESY Owens and YESY Marie.    Head WDL  Ears WDL  Nose WDL  Mouth WDL  Neck WDL  Breast/Chest WDL  Shoulder Blades WDL  Spine WDL  (R) Arm/Elbow/Hand WDL  (L) Arm/Elbow/Hand WDL  Abdomen WDL  Groin WDL  Scrotum/Coccyx/Buttocks WDL  (R) Leg WDL  (L) Leg WDL  (R) Heel/Foot/Toe WDL  (L) Heel/Foot/Toe WDL          Devices In Places Blood Pressure Cuff and Pulse Ox      Interventions In Place NC W/Ear Foams    Possible Skin Injury No    Pictures Uploaded Into Epic N/A  Wound Consult Placed N/A  RN Wound Prevention Protocol Ordered No

## 2023-05-11 NOTE — PROGRESS NOTES
Assumed care of patient at 0700 from Graciela HAYWARD. Patient is A&O x 4, states pain level is 0 /10.Bed locked in the lowest position, 2 side rails up, Call light within reach, belongings at bedside. Patient expresses no needs at this time and hourly rounding is in place. Walked patient without oxygen and she maintained O2 of 88-91.

## 2023-05-11 NOTE — PROGRESS NOTES
"Received alert and oriented x 4. Check vitals sign and recorded accordingly and due med given per MAR. Monitor sign and symptoms of respiratory distress and treatment given accordingly per MAR.Medicated per MAR and reassessed every 2 hours per protocol. Call light within reach. Needs attended. Will continue to monitor./68   Pulse 82   Temp 36.3 °C (97.4 °F) (Temporal)   Resp 17   Ht 1.575 m (5' 2.01\")   Wt 71.7 kg (158 lb 1.1 oz)   SpO2 95%   BMI 28.90 kg/m² .   "

## 2023-05-11 NOTE — CARE PLAN
The patient is Stable - Low risk of patient condition declining or worsening    Shift Goals  Clinical Goals: titrate 02 as tolerated, increase oral fluid for urine concerns  Patient Goals: Bleeding nose and wants to go home  Family Goals: home    Progress made toward(s) clinical / shift goals:  Educated regarding 02 dryness that can cause nose bleeding, titrated 02 as tolerated for dc planning and to increase oral liquid for better urine output and color concerns.  Problem: Knowledge Deficit - Standard  Goal: Patient and family/care givers will demonstrate understanding of plan of care, disease process/condition, diagnostic tests and medications  Description: Target End Date:  1-3 days or as soon as patient condition allows    Document in Patient Education    1.  Patient and family/caregiver oriented to unit, equipment, visitation policy and means for communicating concern  2.  Complete/review Learning Assessment  3.  Assess knowledge level of disease process/condition, treatment plan, diagnostic tests and medications  4.  Explain disease process/condition, treatment plan, diagnostic tests and medications  Outcome: Progressing       Patient is not progressing towards the following goals:

## 2023-11-11 NOTE — PROGRESS NOTES
[facility] Medicine Daily Progress Note    Date of Service  5/10/2023    Chief Complaint  Yaquelin Mccray is a 38 y.o. female admitted 5/9/2023 with shortness of breath and cough.    Hospital Course  Yaquelin Mccray is a 38 y.o. female who presented 5/9/2023 with past medical history of asthma who comes into the hospital for shortness of breath and cough for 15 days.  It is associated with wheezing.  She states that the cough has whitish and clear sputum.  She tried taking her rescue inhaler but it was ineffective in treating her symptoms.  She has frequent nighttime awakenings for the past 2 weeks.  Her last asthma attack was 3 years ago but she has never been intubated.  She denies smoking cigarettes.  Patient denies any recent new pets.     Chest x-ray found no acute pulmonary process.  Viral panel is negative.    Interval Problem Update  5/10 afebrile, vitals are stable, on 2 to 3 L nasal cannula.  No white count.  LFTs are trending down.  Viral panel is negative.  Continue IV steroids, inhaled steroids, DuoNebs, and continue to wean oxygen.  Encouraged incentive spirometry.  Hopeful for discharge tomorrow.    I have discussed this patient's plan of care and discharge plan at IDT rounds today with Case Management, Nursing, Nursing leadership, and other members of the IDT team.    Consultants/Specialty  none    Code Status  Full Code    Disposition  The patient is not medically cleared for discharge to home or a post-acute facility.  Anticipate discharge to: home with close outpatient follow-up    I have placed the appropriate orders for post-discharge needs.    Review of Systems  Review of Systems   Constitutional:  Negative for chills, fever and malaise/fatigue.   Respiratory:  Positive for cough, shortness of breath and wheezing.    Cardiovascular:  Negative for chest pain, palpitations and leg swelling.   Gastrointestinal:  Negative for abdominal pain, diarrhea,  heartburn, nausea and vomiting.   Genitourinary:  Negative for dysuria, frequency and urgency.   Neurological:  Negative for dizziness and headaches.   All other systems reviewed and are negative.       Physical Exam  Temp:  [37.1 °C (98.8 °F)] 37.1 °C (98.8 °F)  Pulse:  [] 114  Resp:  [14-26] 18  BP: ()/(53-86) 106/71  SpO2:  [86 %-98 %] 95 %    Physical Exam  Vitals and nursing note reviewed.   Constitutional:       Appearance: Normal appearance. She is not ill-appearing.   HENT:      Head: Normocephalic and atraumatic.      Jaw: There is normal jaw occlusion.      Right Ear: Hearing normal.      Left Ear: Hearing normal.      Nose: Nose normal.      Mouth/Throat:      Lips: Pink.      Mouth: Mucous membranes are moist.   Eyes:      Extraocular Movements: Extraocular movements intact.      Conjunctiva/sclera: Conjunctivae normal.      Pupils: Pupils are equal, round, and reactive to light.   Neck:      Vascular: No carotid bruit.   Cardiovascular:      Rate and Rhythm: Normal rate and regular rhythm.      Pulses: Normal pulses.      Heart sounds: Normal heart sounds, S1 normal and S2 normal.   Pulmonary:      Effort: Pulmonary effort is normal.      Breath sounds: Decreased air movement present. No stridor. Examination of the right-upper field reveals wheezing. Examination of the left-upper field reveals wheezing. Examination of the right-middle field reveals wheezing. Examination of the left-middle field reveals wheezing. Examination of the right-lower field reveals decreased breath sounds. Examination of the left-lower field reveals decreased breath sounds. Decreased breath sounds and wheezing present.   Abdominal:      General: Bowel sounds are normal.      Palpations: Abdomen is soft.      Tenderness: There is no abdominal tenderness.   Musculoskeletal:      Cervical back: Normal range of motion and neck supple.      Right lower leg: No edema.      Left lower leg: No edema.   Skin:     General:  Skin is warm and dry.      Capillary Refill: Capillary refill takes less than 2 seconds.   Neurological:      General: No focal deficit present.      Mental Status: She is alert and oriented to person, place, and time. Mental status is at baseline.      Sensory: Sensation is intact.      Motor: Motor function is intact.   Psychiatric:         Attention and Perception: Attention and perception normal.         Mood and Affect: Mood and affect normal.         Speech: Speech normal.         Behavior: Behavior normal. Behavior is cooperative.         Fluids  No intake or output data in the 24 hours ending 05/10/23 0917    Laboratory  Recent Labs     05/09/23  1618 05/10/23  0305   WBC 10.8 6.9   RBC 5.41* 5.02   HEMOGLOBIN 16.0 14.6   HEMATOCRIT 49.2* 45.0   MCV 90.9 89.6   MCH 29.6 29.1   MCHC 32.5* 32.4*   RDW 41.9 41.5   PLATELETCT 343 337   MPV 9.3 9.1     Recent Labs     05/09/23  1618 05/10/23  0305   SODIUM 138 136   POTASSIUM 4.1 3.9   CHLORIDE 100 101   CO2 26 23   GLUCOSE 89 196*   BUN 11 12   CREATININE 0.51 0.66   CALCIUM 9.6 9.2                   Imaging  DX-CHEST-PORTABLE (1 VIEW)   Final Result      No acute cardiopulmonary abnormality identified.           Assessment/Plan  * Acute asthma exacerbation- (present on admission)  Assessment & Plan  Frequent nighttime awakenings and 15 days of symptoms  DuoNeb inhalers every 4 hours  IV steroids  Start inhaled steroids  Outpatient pulmonary follow-up    Acute respiratory failure with hypoxia (HCC)  Assessment & Plan  On 2 L of O2 above baseline  Continue to wean off as necessary    Elevated liver enzymes  Assessment & Plan  Continue to trend  Avoid hepatotoxic medications  5/10 LFTs are trending down, recheck in a.m.         VTE prophylaxis: SCDs/TEDs    I have performed a physical exam and reviewed and updated ROS and Plan today (5/10/2023). In review of yesterday's note (5/9/2023), there are no changes except as documented above.         11-Nov-2023 22:39

## 2024-04-13 ENCOUNTER — APPOINTMENT (OUTPATIENT)
Dept: RADIOLOGY | Facility: MEDICAL CENTER | Age: 39
DRG: 819 | End: 2024-04-13
Attending: EMERGENCY MEDICINE
Payer: COMMERCIAL

## 2024-04-13 ENCOUNTER — ANESTHESIA (OUTPATIENT)
Dept: SURGERY | Facility: MEDICAL CENTER | Age: 39
DRG: 819 | End: 2024-04-13
Payer: COMMERCIAL

## 2024-04-13 ENCOUNTER — HOSPITAL ENCOUNTER (INPATIENT)
Facility: MEDICAL CENTER | Age: 39
LOS: 1 days | DRG: 819 | End: 2024-04-15
Attending: EMERGENCY MEDICINE | Admitting: OBSTETRICS & GYNECOLOGY
Payer: COMMERCIAL

## 2024-04-13 ENCOUNTER — ANESTHESIA EVENT (OUTPATIENT)
Dept: SURGERY | Facility: MEDICAL CENTER | Age: 39
DRG: 819 | End: 2024-04-13
Payer: COMMERCIAL

## 2024-04-13 DIAGNOSIS — D64.9 ANEMIA, UNSPECIFIED TYPE: ICD-10-CM

## 2024-04-13 DIAGNOSIS — G89.18 POST-OPERATIVE PAIN: ICD-10-CM

## 2024-04-13 DIAGNOSIS — O00.109 TUBAL PREGNANCY WITHOUT INTRAUTERINE PREGNANCY, UNSPECIFIED LATERALITY: Primary | ICD-10-CM

## 2024-04-13 DIAGNOSIS — Z98.890 HISTORY OF LAPAROTOMY: ICD-10-CM

## 2024-04-13 PROBLEM — J45.909 ASTHMA: Status: ACTIVE | Noted: 2024-04-13

## 2024-04-13 LAB
ABO + RH BLD: NORMAL
ABO GROUP BLD: NORMAL
ALBUMIN SERPL BCP-MCNC: 3.5 G/DL (ref 3.2–4.9)
ALBUMIN/GLOB SERPL: 1.3 G/DL
ALP SERPL-CCNC: 82 U/L (ref 30–99)
ALT SERPL-CCNC: 42 U/L (ref 2–50)
ANION GAP SERPL CALC-SCNC: 12 MMOL/L (ref 7–16)
APPEARANCE UR: CLEAR
AST SERPL-CCNC: 26 U/L (ref 12–45)
B-HCG SERPL-ACNC: 8075 MIU/ML (ref 0–5)
BASOPHILS # BLD AUTO: 0.6 % (ref 0–1.8)
BASOPHILS # BLD: 0.05 K/UL (ref 0–0.12)
BILIRUB SERPL-MCNC: 0.7 MG/DL (ref 0.1–1.5)
BILIRUB UR QL STRIP.AUTO: NEGATIVE
BLD GP AB SCN SERPL QL: NORMAL
BUN SERPL-MCNC: 8 MG/DL (ref 8–22)
CALCIUM ALBUM COR SERPL-MCNC: 8.9 MG/DL (ref 8.5–10.5)
CALCIUM SERPL-MCNC: 8.5 MG/DL (ref 8.5–10.5)
CHLORIDE SERPL-SCNC: 103 MMOL/L (ref 96–112)
CO2 SERPL-SCNC: 20 MMOL/L (ref 20–33)
COLOR UR: YELLOW
CREAT SERPL-MCNC: 0.35 MG/DL (ref 0.5–1.4)
EOSINOPHIL # BLD AUTO: 0.31 K/UL (ref 0–0.51)
EOSINOPHIL NFR BLD: 3.6 % (ref 0–6.9)
ERYTHROCYTE [DISTWIDTH] IN BLOOD BY AUTOMATED COUNT: 43.8 FL (ref 35.9–50)
GFR SERPLBLD CREATININE-BSD FMLA CKD-EPI: 132 ML/MIN/1.73 M 2
GLOBULIN SER CALC-MCNC: 2.6 G/DL (ref 1.9–3.5)
GLUCOSE SERPL-MCNC: 84 MG/DL (ref 65–99)
GLUCOSE UR STRIP.AUTO-MCNC: NEGATIVE MG/DL
HCG SERPL QL: POSITIVE
HCT VFR BLD AUTO: 33 % (ref 37–47)
HGB BLD-MCNC: 11 G/DL (ref 12–16)
IMM GRANULOCYTES # BLD AUTO: 0.03 K/UL (ref 0–0.11)
IMM GRANULOCYTES NFR BLD AUTO: 0.3 % (ref 0–0.9)
KETONES UR STRIP.AUTO-MCNC: NEGATIVE MG/DL
LEUKOCYTE ESTERASE UR QL STRIP.AUTO: NEGATIVE
LIPASE SERPL-CCNC: 28 U/L (ref 11–82)
LYMPHOCYTES # BLD AUTO: 2 K/UL (ref 1–4.8)
LYMPHOCYTES NFR BLD: 23.1 % (ref 22–41)
MCH RBC QN AUTO: 30.3 PG (ref 27–33)
MCHC RBC AUTO-ENTMCNC: 33.3 G/DL (ref 32.2–35.5)
MCV RBC AUTO: 90.9 FL (ref 81.4–97.8)
MICRO URNS: NORMAL
MONOCYTES # BLD AUTO: 0.58 K/UL (ref 0–0.85)
MONOCYTES NFR BLD AUTO: 6.7 % (ref 0–13.4)
NEUTROPHILS # BLD AUTO: 5.67 K/UL (ref 1.82–7.42)
NEUTROPHILS NFR BLD: 65.7 % (ref 44–72)
NITRITE UR QL STRIP.AUTO: NEGATIVE
NRBC # BLD AUTO: 0 K/UL
NRBC BLD-RTO: 0 /100 WBC (ref 0–0.2)
PH UR STRIP.AUTO: 6.5 [PH] (ref 5–8)
PLATELET # BLD AUTO: ABNORMAL K/UL (ref 164–446)
PMV BLD AUTO: 11 FL (ref 9–12.9)
POTASSIUM SERPL-SCNC: 3.6 MMOL/L (ref 3.6–5.5)
PROT SERPL-MCNC: 6.1 G/DL (ref 6–8.2)
PROT UR QL STRIP: NEGATIVE MG/DL
RBC # BLD AUTO: 3.63 M/UL (ref 4.2–5.4)
RBC UR QL AUTO: NEGATIVE
RH BLD: NORMAL
SODIUM SERPL-SCNC: 135 MMOL/L (ref 135–145)
SP GR UR STRIP.AUTO: 1
UROBILINOGEN UR STRIP.AUTO-MCNC: 0.2 MG/DL
WBC # BLD AUTO: 8.6 K/UL (ref 4.8–10.8)

## 2024-04-13 PROCEDURE — 83690 ASSAY OF LIPASE: CPT

## 2024-04-13 PROCEDURE — 81003 URINALYSIS AUTO W/O SCOPE: CPT

## 2024-04-13 PROCEDURE — A9270 NON-COVERED ITEM OR SERVICE: HCPCS | Mod: UD | Performed by: EMERGENCY MEDICINE

## 2024-04-13 PROCEDURE — 86900 BLOOD TYPING SEROLOGIC ABO: CPT

## 2024-04-13 PROCEDURE — 86850 RBC ANTIBODY SCREEN: CPT

## 2024-04-13 PROCEDURE — 84702 CHORIONIC GONADOTROPIN TEST: CPT

## 2024-04-13 PROCEDURE — 700102 HCHG RX REV CODE 250 W/ 637 OVERRIDE(OP): Mod: UD | Performed by: EMERGENCY MEDICINE

## 2024-04-13 PROCEDURE — 85025 COMPLETE CBC W/AUTO DIFF WBC: CPT

## 2024-04-13 PROCEDURE — 36415 COLL VENOUS BLD VENIPUNCTURE: CPT

## 2024-04-13 PROCEDURE — 80053 COMPREHEN METABOLIC PANEL: CPT

## 2024-04-13 PROCEDURE — 76817 TRANSVAGINAL US OBSTETRIC: CPT

## 2024-04-13 PROCEDURE — 86901 BLOOD TYPING SEROLOGIC RH(D): CPT

## 2024-04-13 PROCEDURE — 84703 CHORIONIC GONADOTROPIN ASSAY: CPT

## 2024-04-13 PROCEDURE — 99291 CRITICAL CARE FIRST HOUR: CPT

## 2024-04-13 RX ORDER — ACETAMINOPHEN 500 MG
1000 TABLET ORAL ONCE
Status: COMPLETED | OUTPATIENT
Start: 2024-04-13 | End: 2024-04-13

## 2024-04-13 RX ADMIN — ACETAMINOPHEN 1000 MG: 500 TABLET, FILM COATED ORAL at 20:02

## 2024-04-13 RX ADMIN — IBUPROFEN 800 MG: 200 TABLET, FILM COATED ORAL at 20:02

## 2024-04-13 ASSESSMENT — FIBROSIS 4 INDEX: FIB4 SCORE: 0.59

## 2024-04-14 ENCOUNTER — PHARMACY VISIT (OUTPATIENT)
Dept: PHARMACY | Facility: MEDICAL CENTER | Age: 39
End: 2024-04-14
Payer: MEDICARE

## 2024-04-14 PROBLEM — Z98.890 HISTORY OF LAPAROTOMY: Status: ACTIVE | Noted: 2024-04-14

## 2024-04-14 LAB
ERYTHROCYTE [DISTWIDTH] IN BLOOD BY AUTOMATED COUNT: 42.9 FL (ref 35.9–50)
HCT VFR BLD AUTO: 34.7 % (ref 37–47)
HGB BLD-MCNC: 11.9 G/DL (ref 12–16)
MCH RBC QN AUTO: 30.3 PG (ref 27–33)
MCHC RBC AUTO-ENTMCNC: 34.3 G/DL (ref 32.2–35.5)
MCV RBC AUTO: 88.3 FL (ref 81.4–97.8)
PLATELET # BLD AUTO: 463 K/UL (ref 164–446)
PMV BLD AUTO: 8.9 FL (ref 9–12.9)
RBC # BLD AUTO: 3.93 M/UL (ref 4.2–5.4)
WBC # BLD AUTO: 10.5 K/UL (ref 4.8–10.8)

## 2024-04-14 PROCEDURE — 700111 HCHG RX REV CODE 636 W/ 250 OVERRIDE (IP): Mod: JZ | Performed by: INTERNAL MEDICINE

## 2024-04-14 PROCEDURE — 700101 HCHG RX REV CODE 250: Mod: UD | Performed by: OBSTETRICS & GYNECOLOGY

## 2024-04-14 PROCEDURE — 160035 HCHG PACU - 1ST 60 MINS PHASE I: Performed by: OBSTETRICS & GYNECOLOGY

## 2024-04-14 PROCEDURE — 88305 TISSUE EXAM BY PATHOLOGIST: CPT | Mod: 59

## 2024-04-14 PROCEDURE — 700102 HCHG RX REV CODE 250 W/ 637 OVERRIDE(OP): Performed by: OBSTETRICS & GYNECOLOGY

## 2024-04-14 PROCEDURE — 160031 HCHG SURGERY MINUTES - 1ST 30 MINS LEVEL 5: Performed by: OBSTETRICS & GYNECOLOGY

## 2024-04-14 PROCEDURE — 700102 HCHG RX REV CODE 250 W/ 637 OVERRIDE(OP): Performed by: INTERNAL MEDICINE

## 2024-04-14 PROCEDURE — A9270 NON-COVERED ITEM OR SERVICE: HCPCS | Performed by: INTERNAL MEDICINE

## 2024-04-14 PROCEDURE — 700105 HCHG RX REV CODE 258: Mod: UD | Performed by: INTERNAL MEDICINE

## 2024-04-14 PROCEDURE — 36415 COLL VENOUS BLD VENIPUNCTURE: CPT

## 2024-04-14 PROCEDURE — RXMED WILLOW AMBULATORY MEDICATION CHARGE: Performed by: OBSTETRICS & GYNECOLOGY

## 2024-04-14 PROCEDURE — 700111 HCHG RX REV CODE 636 W/ 250 OVERRIDE (IP): Mod: UD | Performed by: OBSTETRICS & GYNECOLOGY

## 2024-04-14 PROCEDURE — 64488 TAP BLOCK BI INJECTION: CPT | Performed by: OBSTETRICS & GYNECOLOGY

## 2024-04-14 PROCEDURE — 700101 HCHG RX REV CODE 250: Mod: UD | Performed by: INTERNAL MEDICINE

## 2024-04-14 PROCEDURE — 0UB70ZZ EXCISION OF BILATERAL FALLOPIAN TUBES, OPEN APPROACH: ICD-10-PCS | Performed by: OBSTETRICS & GYNECOLOGY

## 2024-04-14 PROCEDURE — 10T24ZZ RESECTION OF PRODUCTS OF CONCEPTION, ECTOPIC, PERCUTANEOUS ENDOSCOPIC APPROACH: ICD-10-PCS | Performed by: OBSTETRICS & GYNECOLOGY

## 2024-04-14 PROCEDURE — 85027 COMPLETE CBC AUTOMATED: CPT

## 2024-04-14 PROCEDURE — 160042 HCHG SURGERY MINUTES - EA ADDL 1 MIN LEVEL 5: Performed by: OBSTETRICS & GYNECOLOGY

## 2024-04-14 PROCEDURE — 3E0T3BZ INTRODUCTION OF ANESTHETIC AGENT INTO PERIPHERAL NERVES AND PLEXI, PERCUTANEOUS APPROACH: ICD-10-PCS | Performed by: INTERNAL MEDICINE

## 2024-04-14 PROCEDURE — 160048 HCHG OR STATISTICAL LEVEL 1-5: Performed by: OBSTETRICS & GYNECOLOGY

## 2024-04-14 PROCEDURE — C1765 ADHESION BARRIER: HCPCS | Performed by: OBSTETRICS & GYNECOLOGY

## 2024-04-14 PROCEDURE — 88302 TISSUE EXAM BY PATHOLOGIST: CPT | Mod: 59

## 2024-04-14 PROCEDURE — 700111 HCHG RX REV CODE 636 W/ 250 OVERRIDE (IP): Mod: UD | Performed by: INTERNAL MEDICINE

## 2024-04-14 PROCEDURE — A9270 NON-COVERED ITEM OR SERVICE: HCPCS | Performed by: OBSTETRICS & GYNECOLOGY

## 2024-04-14 PROCEDURE — 160002 HCHG RECOVERY MINUTES (STAT): Performed by: OBSTETRICS & GYNECOLOGY

## 2024-04-14 PROCEDURE — 160009 HCHG ANES TIME/MIN: Performed by: OBSTETRICS & GYNECOLOGY

## 2024-04-14 PROCEDURE — 770001 HCHG ROOM/CARE - MED/SURG/GYN PRIV*

## 2024-04-14 PROCEDURE — 700105 HCHG RX REV CODE 258: Performed by: OBSTETRICS & GYNECOLOGY

## 2024-04-14 RX ORDER — HYDRALAZINE HYDROCHLORIDE 20 MG/ML
5 INJECTION INTRAMUSCULAR; INTRAVENOUS
Status: DISCONTINUED | OUTPATIENT
Start: 2024-04-14 | End: 2024-04-14 | Stop reason: HOSPADM

## 2024-04-14 RX ORDER — ACETAMINOPHEN 500 MG
1000 TABLET ORAL EVERY 6 HOURS
Status: DISCONTINUED | OUTPATIENT
Start: 2024-04-14 | End: 2024-04-15 | Stop reason: HOSPADM

## 2024-04-14 RX ORDER — OXYCODONE HYDROCHLORIDE AND ACETAMINOPHEN 5; 325 MG/1; MG/1
1 TABLET ORAL EVERY 4 HOURS PRN
Qty: 14 TABLET | Refills: 0 | Status: SHIPPED | OUTPATIENT
Start: 2024-04-14 | End: 2024-04-21

## 2024-04-14 RX ORDER — OXYCODONE HCL 5 MG/5 ML
10 SOLUTION, ORAL ORAL
Status: COMPLETED | OUTPATIENT
Start: 2024-04-14 | End: 2024-04-14

## 2024-04-14 RX ORDER — ONDANSETRON 2 MG/ML
4 INJECTION INTRAMUSCULAR; INTRAVENOUS EVERY 4 HOURS PRN
Status: DISCONTINUED | OUTPATIENT
Start: 2024-04-14 | End: 2024-04-15 | Stop reason: HOSPADM

## 2024-04-14 RX ORDER — CEFAZOLIN SODIUM 1 G/3ML
INJECTION, POWDER, FOR SOLUTION INTRAMUSCULAR; INTRAVENOUS PRN
Status: DISCONTINUED | OUTPATIENT
Start: 2024-04-14 | End: 2024-04-14 | Stop reason: SURG

## 2024-04-14 RX ORDER — IBUPROFEN 800 MG/1
800 TABLET ORAL 3 TIMES DAILY PRN
Status: DISCONTINUED | OUTPATIENT
Start: 2024-04-19 | End: 2024-04-15 | Stop reason: HOSPADM

## 2024-04-14 RX ORDER — OXYCODONE HCL 5 MG/5 ML
5 SOLUTION, ORAL ORAL
Status: COMPLETED | OUTPATIENT
Start: 2024-04-14 | End: 2024-04-14

## 2024-04-14 RX ORDER — HYDROMORPHONE HYDROCHLORIDE 1 MG/ML
0.1 INJECTION, SOLUTION INTRAMUSCULAR; INTRAVENOUS; SUBCUTANEOUS
Status: DISCONTINUED | OUTPATIENT
Start: 2024-04-14 | End: 2024-04-14 | Stop reason: HOSPADM

## 2024-04-14 RX ORDER — DEXTROSE, SODIUM CHLORIDE, SODIUM LACTATE, POTASSIUM CHLORIDE, AND CALCIUM CHLORIDE 5; .6; .31; .03; .02 G/100ML; G/100ML; G/100ML; G/100ML; G/100ML
INJECTION, SOLUTION INTRAVENOUS CONTINUOUS
Status: DISCONTINUED | OUTPATIENT
Start: 2024-04-14 | End: 2024-04-15 | Stop reason: HOSPADM

## 2024-04-14 RX ORDER — DEXAMETHASONE SODIUM PHOSPHATE 4 MG/ML
4 INJECTION, SOLUTION INTRA-ARTICULAR; INTRALESIONAL; INTRAMUSCULAR; INTRAVENOUS; SOFT TISSUE
Status: DISCONTINUED | OUTPATIENT
Start: 2024-04-14 | End: 2024-04-15 | Stop reason: HOSPADM

## 2024-04-14 RX ORDER — IBUPROFEN 800 MG/1
800 TABLET ORAL 3 TIMES DAILY
Status: DISCONTINUED | OUTPATIENT
Start: 2024-04-14 | End: 2024-04-15 | Stop reason: HOSPADM

## 2024-04-14 RX ORDER — ONDANSETRON 2 MG/ML
INJECTION INTRAMUSCULAR; INTRAVENOUS PRN
Status: DISCONTINUED | OUTPATIENT
Start: 2024-04-14 | End: 2024-04-14 | Stop reason: SURG

## 2024-04-14 RX ORDER — HALOPERIDOL 5 MG/ML
1 INJECTION INTRAMUSCULAR EVERY 6 HOURS PRN
Status: DISCONTINUED | OUTPATIENT
Start: 2024-04-14 | End: 2024-04-15 | Stop reason: HOSPADM

## 2024-04-14 RX ORDER — DOCUSATE SODIUM 100 MG/1
100 CAPSULE, LIQUID FILLED ORAL 2 TIMES DAILY
Status: DISCONTINUED | OUTPATIENT
Start: 2024-04-14 | End: 2024-04-15 | Stop reason: HOSPADM

## 2024-04-14 RX ORDER — KETOROLAC TROMETHAMINE 15 MG/ML
INJECTION, SOLUTION INTRAMUSCULAR; INTRAVENOUS PRN
Status: DISCONTINUED | OUTPATIENT
Start: 2024-04-14 | End: 2024-04-14 | Stop reason: SURG

## 2024-04-14 RX ORDER — LIDOCAINE HYDROCHLORIDE 20 MG/ML
INJECTION, SOLUTION EPIDURAL; INFILTRATION; INTRACAUDAL; PERINEURAL PRN
Status: DISCONTINUED | OUTPATIENT
Start: 2024-04-14 | End: 2024-04-14 | Stop reason: SURG

## 2024-04-14 RX ORDER — MIDAZOLAM HYDROCHLORIDE 1 MG/ML
1 INJECTION INTRAMUSCULAR; INTRAVENOUS
Status: DISCONTINUED | OUTPATIENT
Start: 2024-04-14 | End: 2024-04-14 | Stop reason: HOSPADM

## 2024-04-14 RX ORDER — AMOXICILLIN 250 MG
1 CAPSULE ORAL NIGHTLY
Status: DISCONTINUED | OUTPATIENT
Start: 2024-04-14 | End: 2024-04-15 | Stop reason: HOSPADM

## 2024-04-14 RX ORDER — ENEMA 19; 7 G/133ML; G/133ML
1 ENEMA RECTAL
Status: DISCONTINUED | OUTPATIENT
Start: 2024-04-14 | End: 2024-04-15 | Stop reason: HOSPADM

## 2024-04-14 RX ORDER — HYDROMORPHONE HYDROCHLORIDE 2 MG/ML
INJECTION, SOLUTION INTRAMUSCULAR; INTRAVENOUS; SUBCUTANEOUS PRN
Status: DISCONTINUED | OUTPATIENT
Start: 2024-04-14 | End: 2024-04-14 | Stop reason: SURG

## 2024-04-14 RX ORDER — IBUPROFEN 600 MG/1
600 TABLET ORAL EVERY 6 HOURS PRN
Qty: 60 TABLET | Refills: 1 | Status: SHIPPED | OUTPATIENT
Start: 2024-04-14

## 2024-04-14 RX ORDER — SODIUM CHLORIDE, SODIUM GLUCONATE, SODIUM ACETATE, POTASSIUM CHLORIDE AND MAGNESIUM CHLORIDE 526; 502; 368; 37; 30 MG/100ML; MG/100ML; MG/100ML; MG/100ML; MG/100ML
INJECTION, SOLUTION INTRAVENOUS
Status: DISCONTINUED | OUTPATIENT
Start: 2024-04-14 | End: 2024-04-14 | Stop reason: SURG

## 2024-04-14 RX ORDER — OXYCODONE HYDROCHLORIDE 5 MG/1
2.5 TABLET ORAL
Status: DISCONTINUED | OUTPATIENT
Start: 2024-04-14 | End: 2024-04-15 | Stop reason: HOSPADM

## 2024-04-14 RX ORDER — VASOPRESSIN 20 U/ML
INJECTION PARENTERAL
Status: COMPLETED | OUTPATIENT
Start: 2024-04-14 | End: 2024-04-14

## 2024-04-14 RX ORDER — OXYCODONE HYDROCHLORIDE 5 MG/1
5 TABLET ORAL
Status: DISCONTINUED | OUTPATIENT
Start: 2024-04-14 | End: 2024-04-15 | Stop reason: HOSPADM

## 2024-04-14 RX ORDER — AMOXICILLIN 250 MG
1 CAPSULE ORAL
Status: DISCONTINUED | OUTPATIENT
Start: 2024-04-14 | End: 2024-04-15 | Stop reason: HOSPADM

## 2024-04-14 RX ORDER — DOCUSATE SODIUM 100 MG/1
100 CAPSULE, LIQUID FILLED ORAL 2 TIMES DAILY
Qty: 60 CAPSULE | Refills: 1 | Status: SHIPPED | OUTPATIENT
Start: 2024-04-14

## 2024-04-14 RX ORDER — HALOPERIDOL 5 MG/ML
1 INJECTION INTRAMUSCULAR
Status: DISCONTINUED | OUTPATIENT
Start: 2024-04-14 | End: 2024-04-14 | Stop reason: HOSPADM

## 2024-04-14 RX ORDER — HYDROMORPHONE HYDROCHLORIDE 1 MG/ML
0.4 INJECTION, SOLUTION INTRAMUSCULAR; INTRAVENOUS; SUBCUTANEOUS
Status: DISCONTINUED | OUTPATIENT
Start: 2024-04-14 | End: 2024-04-14 | Stop reason: HOSPADM

## 2024-04-14 RX ORDER — DIPHENHYDRAMINE HYDROCHLORIDE 50 MG/ML
25 INJECTION INTRAMUSCULAR; INTRAVENOUS EVERY 6 HOURS PRN
Status: DISCONTINUED | OUTPATIENT
Start: 2024-04-14 | End: 2024-04-15 | Stop reason: HOSPADM

## 2024-04-14 RX ORDER — HYDROMORPHONE HYDROCHLORIDE 1 MG/ML
0.25 INJECTION, SOLUTION INTRAMUSCULAR; INTRAVENOUS; SUBCUTANEOUS
Status: DISCONTINUED | OUTPATIENT
Start: 2024-04-14 | End: 2024-04-15 | Stop reason: HOSPADM

## 2024-04-14 RX ORDER — SCOLOPAMINE TRANSDERMAL SYSTEM 1 MG/1
1 PATCH, EXTENDED RELEASE TRANSDERMAL
Status: DISCONTINUED | OUTPATIENT
Start: 2024-04-14 | End: 2024-04-15 | Stop reason: HOSPADM

## 2024-04-14 RX ORDER — POLYETHYLENE GLYCOL 3350 17 G/17G
1 POWDER, FOR SOLUTION ORAL 2 TIMES DAILY PRN
Status: DISCONTINUED | OUTPATIENT
Start: 2024-04-14 | End: 2024-04-15 | Stop reason: HOSPADM

## 2024-04-14 RX ORDER — BUPIVACAINE HYDROCHLORIDE 5 MG/ML
INJECTION, SOLUTION EPIDURAL; INTRACAUDAL
Status: COMPLETED | OUTPATIENT
Start: 2024-04-14 | End: 2024-04-14

## 2024-04-14 RX ORDER — DEXAMETHASONE SODIUM PHOSPHATE 4 MG/ML
INJECTION, SOLUTION INTRA-ARTICULAR; INTRALESIONAL; INTRAMUSCULAR; INTRAVENOUS; SOFT TISSUE PRN
Status: DISCONTINUED | OUTPATIENT
Start: 2024-04-14 | End: 2024-04-14 | Stop reason: SURG

## 2024-04-14 RX ORDER — ONDANSETRON 2 MG/ML
4 INJECTION INTRAMUSCULAR; INTRAVENOUS
Status: COMPLETED | OUTPATIENT
Start: 2024-04-14 | End: 2024-04-14

## 2024-04-14 RX ORDER — ACETAMINOPHEN 500 MG
1000 TABLET ORAL EVERY 6 HOURS PRN
Status: DISCONTINUED | OUTPATIENT
Start: 2024-04-19 | End: 2024-04-15 | Stop reason: HOSPADM

## 2024-04-14 RX ORDER — ROCURONIUM BROMIDE 10 MG/ML
INJECTION, SOLUTION INTRAVENOUS PRN
Status: DISCONTINUED | OUTPATIENT
Start: 2024-04-14 | End: 2024-04-14 | Stop reason: SURG

## 2024-04-14 RX ORDER — LABETALOL HYDROCHLORIDE 5 MG/ML
5 INJECTION, SOLUTION INTRAVENOUS
Status: DISCONTINUED | OUTPATIENT
Start: 2024-04-14 | End: 2024-04-14 | Stop reason: HOSPADM

## 2024-04-14 RX ORDER — BUPIVACAINE HYDROCHLORIDE AND EPINEPHRINE 2.5; 5 MG/ML; UG/ML
INJECTION, SOLUTION EPIDURAL; INFILTRATION; INTRACAUDAL; PERINEURAL
Status: DISCONTINUED | OUTPATIENT
Start: 2024-04-14 | End: 2024-04-14 | Stop reason: HOSPADM

## 2024-04-14 RX ORDER — SODIUM CHLORIDE, SODIUM LACTATE, POTASSIUM CHLORIDE, CALCIUM CHLORIDE 600; 310; 30; 20 MG/100ML; MG/100ML; MG/100ML; MG/100ML
INJECTION, SOLUTION INTRAVENOUS
Status: DISCONTINUED | OUTPATIENT
Start: 2024-04-14 | End: 2024-04-14 | Stop reason: SURG

## 2024-04-14 RX ORDER — MEPERIDINE HYDROCHLORIDE 25 MG/ML
12.5 INJECTION INTRAMUSCULAR; INTRAVENOUS; SUBCUTANEOUS
Status: DISCONTINUED | OUTPATIENT
Start: 2024-04-14 | End: 2024-04-14 | Stop reason: HOSPADM

## 2024-04-14 RX ORDER — BISACODYL 10 MG
10 SUPPOSITORY, RECTAL RECTAL
Status: DISCONTINUED | OUTPATIENT
Start: 2024-04-14 | End: 2024-04-15 | Stop reason: HOSPADM

## 2024-04-14 RX ORDER — DIPHENHYDRAMINE HYDROCHLORIDE 50 MG/ML
12.5 INJECTION INTRAMUSCULAR; INTRAVENOUS
Status: DISCONTINUED | OUTPATIENT
Start: 2024-04-14 | End: 2024-04-14 | Stop reason: HOSPADM

## 2024-04-14 RX ORDER — HYDROMORPHONE HYDROCHLORIDE 1 MG/ML
0.2 INJECTION, SOLUTION INTRAMUSCULAR; INTRAVENOUS; SUBCUTANEOUS
Status: DISCONTINUED | OUTPATIENT
Start: 2024-04-14 | End: 2024-04-14 | Stop reason: HOSPADM

## 2024-04-14 RX ORDER — MIDAZOLAM HYDROCHLORIDE 1 MG/ML
INJECTION INTRAMUSCULAR; INTRAVENOUS PRN
Status: DISCONTINUED | OUTPATIENT
Start: 2024-04-14 | End: 2024-04-14 | Stop reason: SURG

## 2024-04-14 RX ADMIN — FENTANYL CITRATE 100 MCG: 50 INJECTION, SOLUTION INTRAMUSCULAR; INTRAVENOUS at 00:20

## 2024-04-14 RX ADMIN — MIDAZOLAM HYDROCHLORIDE 2 MG: 1 INJECTION, SOLUTION INTRAMUSCULAR; INTRAVENOUS at 00:16

## 2024-04-14 RX ADMIN — DOCUSATE SODIUM 100 MG: 100 CAPSULE, LIQUID FILLED ORAL at 06:24

## 2024-04-14 RX ADMIN — OXYCODONE HYDROCHLORIDE 10 MG: 5 SOLUTION ORAL at 02:06

## 2024-04-14 RX ADMIN — DOCUSATE SODIUM 50 MG AND SENNOSIDES 8.6 MG 1 TABLET: 8.6; 5 TABLET, FILM COATED ORAL at 06:24

## 2024-04-14 RX ADMIN — DOCUSATE SODIUM 100 MG: 100 CAPSULE, LIQUID FILLED ORAL at 18:16

## 2024-04-14 RX ADMIN — DEXAMETHASONE SODIUM PHOSPHATE 8 MG: 4 INJECTION INTRA-ARTICULAR; INTRALESIONAL; INTRAMUSCULAR; INTRAVENOUS; SOFT TISSUE at 00:25

## 2024-04-14 RX ADMIN — LIDOCAINE HYDROCHLORIDE 70 MG: 20 INJECTION, SOLUTION EPIDURAL; INFILTRATION; INTRACAUDAL at 00:20

## 2024-04-14 RX ADMIN — FENTANYL CITRATE 50 MCG: 50 INJECTION, SOLUTION INTRAMUSCULAR; INTRAVENOUS at 00:39

## 2024-04-14 RX ADMIN — OXYCODONE 5 MG: 5 TABLET ORAL at 16:33

## 2024-04-14 RX ADMIN — IBUPROFEN 800 MG: 800 TABLET, FILM COATED ORAL at 18:17

## 2024-04-14 RX ADMIN — CEFAZOLIN 2 G: 1 INJECTION, POWDER, FOR SOLUTION INTRAMUSCULAR; INTRAVENOUS at 00:25

## 2024-04-14 RX ADMIN — IBUPROFEN 800 MG: 800 TABLET, FILM COATED ORAL at 06:24

## 2024-04-14 RX ADMIN — IBUPROFEN 800 MG: 800 TABLET, FILM COATED ORAL at 13:28

## 2024-04-14 RX ADMIN — ONDANSETRON 4 MG: 2 INJECTION INTRAMUSCULAR; INTRAVENOUS at 02:06

## 2024-04-14 RX ADMIN — ROCURONIUM BROMIDE 50 MG: 50 INJECTION, SOLUTION INTRAVENOUS at 00:20

## 2024-04-14 RX ADMIN — BUPIVACAINE HYDROCHLORIDE 30 ML: 5 INJECTION, SOLUTION EPIDURAL; INTRACAUDAL at 01:44

## 2024-04-14 RX ADMIN — SODIUM CHLORIDE, SODIUM GLUCONATE, SODIUM ACETATE, POTASSIUM CHLORIDE AND MAGNESIUM CHLORIDE: 526; 502; 368; 37; 30 INJECTION, SOLUTION INTRAVENOUS at 01:16

## 2024-04-14 RX ADMIN — ACETAMINOPHEN 1000 MG: 500 TABLET, FILM COATED ORAL at 18:16

## 2024-04-14 RX ADMIN — ACETAMINOPHEN 1000 MG: 500 TABLET, FILM COATED ORAL at 06:24

## 2024-04-14 RX ADMIN — FENTANYL CITRATE 50 MCG: 50 INJECTION, SOLUTION INTRAMUSCULAR; INTRAVENOUS at 01:36

## 2024-04-14 RX ADMIN — KETOROLAC TROMETHAMINE 15 MG: 15 INJECTION, SOLUTION INTRAMUSCULAR; INTRAVENOUS at 01:40

## 2024-04-14 RX ADMIN — ONDANSETRON 4 MG: 2 INJECTION INTRAMUSCULAR; INTRAVENOUS at 01:40

## 2024-04-14 RX ADMIN — FENTANYL CITRATE 50 MCG: 50 INJECTION, SOLUTION INTRAMUSCULAR; INTRAVENOUS at 02:06

## 2024-04-14 RX ADMIN — HYDROMORPHONE HYDROCHLORIDE 0.5 MG: 2 INJECTION INTRAMUSCULAR; INTRAVENOUS; SUBCUTANEOUS at 00:58

## 2024-04-14 RX ADMIN — ROCURONIUM BROMIDE 20 MG: 50 INJECTION, SOLUTION INTRAVENOUS at 01:01

## 2024-04-14 RX ADMIN — SODIUM CHLORIDE, POTASSIUM CHLORIDE, SODIUM LACTATE AND CALCIUM CHLORIDE: 600; 310; 30; 20 INJECTION, SOLUTION INTRAVENOUS at 00:16

## 2024-04-14 RX ADMIN — DOCUSATE SODIUM 50 MG AND SENNOSIDES 8.6 MG 1 TABLET: 8.6; 5 TABLET, FILM COATED ORAL at 20:11

## 2024-04-14 RX ADMIN — SODIUM CHLORIDE, SODIUM LACTATE, POTASSIUM CHLORIDE, CALCIUM CHLORIDE AND DEXTROSE MONOHYDRATE: 5; 600; 310; 30; 20 INJECTION, SOLUTION INTRAVENOUS at 04:23

## 2024-04-14 RX ADMIN — PROPOFOL 150 MG: 10 INJECTION, EMULSION INTRAVENOUS at 00:20

## 2024-04-14 RX ADMIN — SUGAMMADEX 200 MG: 100 INJECTION, SOLUTION INTRAVENOUS at 01:47

## 2024-04-14 ASSESSMENT — LIFESTYLE VARIABLES
ALCOHOL_USE: NO
HAVE YOU EVER FELT YOU SHOULD CUT DOWN ON YOUR DRINKING: NO
ON A TYPICAL DAY WHEN YOU DRINK ALCOHOL HOW MANY DRINKS DO YOU HAVE: 0
TOTAL SCORE: 0
HOW MANY TIMES IN THE PAST YEAR HAVE YOU HAD 5 OR MORE DRINKS IN A DAY: 0
AVERAGE NUMBER OF DAYS PER WEEK YOU HAVE A DRINK CONTAINING ALCOHOL: 0
DOES PATIENT WANT TO STOP DRINKING: NO
EVER FELT BAD OR GUILTY ABOUT YOUR DRINKING: NO
EVER HAD A DRINK FIRST THING IN THE MORNING TO STEADY YOUR NERVES TO GET RID OF A HANGOVER: NO
HAVE PEOPLE ANNOYED YOU BY CRITICIZING YOUR DRINKING: NO
TOTAL SCORE: 0
CONSUMPTION TOTAL: NEGATIVE
TOTAL SCORE: 0

## 2024-04-14 ASSESSMENT — PAIN DESCRIPTION - PAIN TYPE
TYPE: ACUTE PAIN
TYPE: SURGICAL PAIN
TYPE: ACUTE PAIN

## 2024-04-14 ASSESSMENT — PATIENT HEALTH QUESTIONNAIRE - PHQ9
9. THOUGHTS THAT YOU WOULD BE BETTER OFF DEAD, OR OF HURTING YOURSELF: NOT AT ALL
1. LITTLE INTEREST OR PLEASURE IN DOING THINGS: NOT AT ALL
3. TROUBLE FALLING OR STAYING ASLEEP OR SLEEPING TOO MUCH: NOT AT ALL
SUM OF ALL RESPONSES TO PHQ QUESTIONS 1-9: 4
7. TROUBLE CONCENTRATING ON THINGS, SUCH AS READING THE NEWSPAPER OR WATCHING TELEVISION: SEVERAL DAYS
SUM OF ALL RESPONSES TO PHQ9 QUESTIONS 1 AND 2: 1
6. FEELING BAD ABOUT YOURSELF - OR THAT YOU ARE A FAILURE OR HAVE LET YOURSELF OR YOUR FAMILY DOWN: NOT AL ALL
5. POOR APPETITE OR OVEREATING: SEVERAL DAYS
2. FEELING DOWN, DEPRESSED, IRRITABLE, OR HOPELESS: SEVERAL DAYS
8. MOVING OR SPEAKING SO SLOWLY THAT OTHER PEOPLE COULD HAVE NOTICED. OR THE OPPOSITE, BEING SO FIGETY OR RESTLESS THAT YOU HAVE BEEN MOVING AROUND A LOT MORE THAN USUAL: NOT AT ALL
4. FEELING TIRED OR HAVING LITTLE ENERGY: SEVERAL DAYS

## 2024-04-14 ASSESSMENT — COGNITIVE AND FUNCTIONAL STATUS - GENERAL
SUGGESTED CMS G CODE MODIFIER MOBILITY: CH
MOBILITY SCORE: 24
DAILY ACTIVITIY SCORE: 24
SUGGESTED CMS G CODE MODIFIER DAILY ACTIVITY: CH

## 2024-04-14 ASSESSMENT — FIBROSIS 4 INDEX: FIB4 SCORE: 0.36

## 2024-04-14 ASSESSMENT — PAIN SCALES - GENERAL: PAIN_LEVEL: 3

## 2024-04-14 NOTE — PROGRESS NOTES
27-Dec-2021 15:33 POD# 1   S/P    1.  Exam under anesthesia.  2.  Diagnostic laparoscopy.  3.  Lysis of adhesions.  4.  Laparotomy.  5.  Bilateral salpingectomy.  6.  Resection of left cornea for ectopic pregnancy and sharp curettage of the left cornea and removal of the ectopic.  7.  Tap block by Dr. Victor    S:  Pt is tolerating cld. Pt with adequate pain control.  Pain medication: Adequate.  Flatus: Negative   Voiding: indwelling catheter   PO intake: Tolerating     O:  Pulse: 92  Blood Pressure: 102/68     Temp  Av.7 °C (98.1 °F)  Min: 36.3 °C (97.3 °F)  Max: 37.7 °C (99.9 °F)  Heart: regular rate and rhythm without gallops or murmurs  Lungs: clear bases  Abdomen: distended and soft/nontender / bowelsounds present / incision clean and dry.  CVA: nontender  Extremities: non-tender  Catheter: indwelling  adequate output/clear    Intake/Output Summary (Last 24 hours) at 2024 1014  Last data filed at 2024 0335  Gross per 24 hour   Intake 1500 ml   Output 550 ml   Net 950 ml      Latest Reference Range & Units 24 19:59 24 22:08 24 00:07   WBC 4.8 - 10.8 K/uL 8.6  10.5   RBC 4.20 - 5.40 M/uL 3.63 (L)  3.93 (L)   Hemoglobin 12.0 - 16.0 g/dL 11.0 (L)  11.9 (L)   Hematocrit 37.0 - 47.0 % 33.0 (L)  34.7 (L)   MCV 81.4 - 97.8 fL 90.9  88.3   MCH 27.0 - 33.0 pg 30.3  30.3   MCHC 32.2 - 35.5 g/dL 33.3  34.3   RDW 35.9 - 50.0 fL 43.8  42.9   Platelet Count 164 - 446 K/uL See Note  463 (H)   MPV 9.0 - 12.9 fL 11.0  8.9 (L)   Neutrophils-Polys 44.00 - 72.00 % 65.70     Neutrophils (Absolute) 1.82 - 7.42 K/uL 5.67     Lymphocytes 22.00 - 41.00 % 23.10     Lymphs (Absolute) 1.00 - 4.80 K/uL 2.00     Monocytes 0.00 - 13.40 % 6.70     Monos (Absolute) 0.00 - 0.85 K/uL 0.58     Eosinophils 0.00 - 6.90 % 3.60     Eos (Absolute) 0.00 - 0.51 K/uL 0.31     Basophils 0.00 - 1.80 % 0.60     Baso (Absolute) 0.00 - 0.12 K/uL 0.05     Immature Granulocytes 0.00 - 0.90 % 0.30     Immature Granulocytes (abs) 0.00 - 0.11 K/uL  0.03     Nucleated RBC 0.00 - 0.20 /100 WBC 0.00     NRBC (Absolute) K/uL 0.00     Sodium 135 - 145 mmol/L  135    Potassium 3.6 - 5.5 mmol/L  3.6    Chloride 96 - 112 mmol/L  103    Co2 20 - 33 mmol/L  20    Anion Gap 7.0 - 16.0   12.0    Glucose 65 - 99 mg/dL  84    Bun 8 - 22 mg/dL  8    Creatinine 0.50 - 1.40 mg/dL  0.35 (L)    GFR (CKD-EPI) >60 mL/min/1.73 m 2  132    Calcium 8.5 - 10.5 mg/dL  8.5    Correct Calcium 8.5 - 10.5 mg/dL  8.9    AST(SGOT) 12 - 45 U/L  26    ALT(SGPT) 2 - 50 U/L  42    Alkaline Phosphatase 30 - 99 U/L  82    Total Bilirubin 0.1 - 1.5 mg/dL  0.7    Albumin 3.2 - 4.9 g/dL  3.5    Total Protein 6.0 - 8.2 g/dL  6.1    (L): Data is abnormally low  (H): Data is abnormally high      A:  POD# 1   S/P 1.  Exam under anesthesia.  2.  Diagnostic laparoscopy.  3.  Lysis of adhesions.  4.  Laparotomy.  5.  Bilateral salpingectomy.  6.  Resection of left cornea for ectopic pregnancy and sharp curettage of the   left cornea and removal of the ectopic.  7.  Tap block by Dr. Vazquez.     Stable/progressing well  P:  DC Zurita, ambulate with assistance and p.r.n. if stable, diet, and oral pain medication, heplock IV.   Rx's: Percocet

## 2024-04-14 NOTE — OR SURGEON
Immediate Post OP Note    PreOp Diagnosis: pelvic pain  Suspected left cornual ectopic pregnancy  Multiparous desires sterilization  AMA  Recent liposuction      PostOp Diagnosis: same      Procedure(s):  EXAM UNDER ANESTHESIA  DIAGNOSTIC LAPAROSCOPY - Wound Class: Contaminated  BILATERAL SALPINGECTOMY - Wound Class: Contaminated  LAPAROTOMY, EXPLORATORY - Wound Class: contaminated  LEFT CORNUAL RESECTION OF ECTOPIC PREGNANCY LYSIS OF ADHESIONS    JAYLA block by Dr Vazquez    Surgeon(s):  Krysten Dunbar M.D.    Anesthesiologist/Type of Anesthesia:  Anesthesiologist: Hari Vazquez M.D./General    Surgical Staff:  Circulator: Dayne Perea R.N.  Scrub Person: Tristen Loevtt    Specimens removed if any:  ID Type Source Tests Collected by Time Destination   A : CORNUAL ECTOPIC Other Other PATHOLOGY SPECIMEN Krysten Dunbar M.D. 4/14/2024  1:30 AM    B : PRODUCTS OF CONCEPTION Tissue Products of Conception PATHOLOGY SPECIMEN Krysten Dunbar M.D. 4/14/2024  1:30 AM    C : LEFT FALLOPIAN TUBE Tissue Fallopian Tube PATHOLOGY SPECIMEN Krysten Dunbar M.D. 4/14/2024  1:30 AM    D : RIGHT Tissue Fallopian Tube PATHOLOGY SPECIMEN Krysten Dunbar M.D. 4/14/2024  1:39 AM        Estimated Blood Loss: less than 50 ml  UO: 300 ml  IVF: 1 liter LR  Vasopressin 20 ml diluted in 100 ml NS. I used 10 ml of this diluted concentration of vasopressin    Findings: AV uterus 8 week size with normal bilateral fallopian tubes and ovaries, bluish discoloration at the left cornua with minimal outpouching. Normal ovaries, normal appendix and normal liver. Omental adhesions from the ant abdominal wall to the omentum on the left side of pelvis. These were taken down.    Complications: none        4/14/2024 1:50 AM Krysten Dunbar M.D.

## 2024-04-14 NOTE — PROGRESS NOTES
Removed ayon catheter per order. Patient tolerated procedure. Education provided regarding voiding expectations.

## 2024-04-14 NOTE — CONSULTS
DATE OF SERVICE:  2024     EMERGENCY ROOM CONSULTATION     ADMITTING DIAGNOSES:    1.  Abdominal pain.  2.  Positive pregnancy test.  3.  Suspected cornual left ectopic pregnancy.     HISTORY OF PRESENT ILLNESS:  This patient is a 39-year-old  3, para 3    female who came into the ER complaining of hip and left lower   quadrant pain for 1 day that has gotten worse.  The patient is not using any   form of contraception.  She states that her menstrual cycles are irregular.    Her last menstrual cycle was the beginning week of 2024.  The patient rates   her pain as 7/10 and it comes and goes.  She was seen in the emergency room   and had an ultrasound that showed a pelvic ultrasound suspicious for a cornual   or interstitial ectopic pregnancy.  I came and examined the patient.  The   patient is in pain.  I discussed with her the ultrasound findings suspicious   for an ectopic pregnancy and at this time, I offered the patient expected   management, methotrexate versus surgical intervention.  I discussed with the   patient the risks, benefits, indications and alternatives to both options and   I recommend that she proceeds with surgical intervention.  The patient at this   time wants to proceed with surgery and therefore, she is being consented for   a laparoscopic left salpingectomy for suspected left ectopic pregnancy, she also wants right salpingectomy for sterilization. She is aware of the possibility of proceeding with a laparotomy with resection of left cornua and possible hysterectomy.      PAST MEDICAL HISTORY:  Asthma.     PAST SURGICAL HISTORY:  1.  Previous  sections x3.  2.  Liposuction last week in [place].     MEDICATIONS:  She is on:  1.  Montelukast.  2.  Albuterol inhaler.  3.  Advair.     ALLERGIES:  PENICILLIN CAUSES A RASH.     OBSTETRICAL HISTORY:  She is a  3, para 3.  Her first  section   was in , second one in  and third one in .      GYNECOLOGIC HISTORY:  The patient started menstruating at age 10, has   irregular menstrual cycles.  The patient is not using anything for   contraception.  She states that her last Pap smear was 3 years ago and it was   normal.  She has irregular menstrual cycles and her last menstrual cycle was   in 2024.     SOCIAL HISTORY:  She denies tobacco, alcohol or drug use.     PHYSICAL EXAMINATION:    VITAL SIGNS:  Blood pressure 128/65, heart rate is 94, respirations 18,   temperature 99.9 and O2 sat 97%.  GENERAL:  Pleasant female in mild distress.  LUNGS:  Clear to auscultation bilaterally.  CARDIOVASCULAR:  Regular rate and rhythm.  No murmur.  ABDOMEN:  Soft.  Positive tenderness to palpation, left greater than right   lower quadrant.  Positive guarding but no rebound.  She does have 2   well-healing liposuction surgical incisions in the groin bilaterally with   bruising from her liposuction on her abdomen.  GENITOURINARY:  Defer.  EXTREMITIES:  No calf tenderness.       LABORATORY DATA:  Her white count 8.6, H 11.0 and 33.0.  Platelets no number given.   confirmed on smear review. Her CMP normal with a creatinine of 0.35.  Normal   AST and normal ALT.  HCG 8075.  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  There is no evidence of fetal pole   or yolk sac.  The right ovary measures 3.45x1.63x1.89 cm.  Left ovary   measures 2.70x1.50x2.32 cm.  No adnexal masses, no free fluid.  Impression is   possible cornual or interstitial ectopic pregnancy in the left cornea.     ASSESSMENT AND PLAN:  The patient is a 39-year-old  3, para 3 Latin   American female.  1.  Suspected left cornual ectopic pregnancy.  The patient at this time is   consented for laparoscopic left salpingectomy, possible laparotomy with   cornual resection of the left fallopian tube, possible hysterectomy and right  salpingectomy for sterilization.  She will  accept blood in the case of hemorrhage.  She is aware of the risks, benefits,  indications and alternatives to surgery.  2.  Asthma.  3.  Status post liposuction last week.        ______________________________  MD GARETH PIERRE/DANIEL    DD:  04/13/2024 23:13  DT:  04/14/2024 00:07    Job#:  [id number]

## 2024-04-14 NOTE — ANESTHESIA PREPROCEDURE EVALUATION
Case: [id number] Date/Time: 04/13/24 1521    Procedure: PELVISCOPY, L SALP    Location: [place] [address] / SURGERY [facility]    Surgeons: Krysten Dunbar M.D.          Ectopic Pregnancy here for pelviscopy and removal.  Concern for Cornual Ectopic    Relevant Problems   PULMONARY   (positive) Asthma       Physical Exam    Airway   Mallampati: II  TM distance: >3 FB  Neck ROM: full       Cardiovascular - normal exam  Rhythm: regular  Rate: normal  (-) murmur     Dental - normal exam           Pulmonary - normal exam  Breath sounds clear to auscultation     Abdominal    Neurological - normal exam                   Anesthesia Plan    ASA 2- EMERGENT   ASA physical status emergent criteria: compromised vital organ, limb or tissue    Plan - general       Airway plan will be ETT                    Informed Consent:

## 2024-04-14 NOTE — PROGRESS NOTES
Bedside report received.  Assessment complete.  A&O x 4. Patient calls appropriately.  Patient is Italian Speaking only.  services in use. Family present at the bedside.   Patient ambulates independently. Bed alarm off.    Patient has 0/10 pain.   Denies N&V. Advanced from NPO to regular diet per MD.  Patient has an abdominal incision with silver island dressing in place. Dressing is clean, dry, and intact.     Patient has a ayon catheter. Site is clean.  + output via ayon, + flatus, - BM (last bowel movement prior to arrival).  Patient denies SOB.  SCD's on.    Review plan with of care with patient. Call light and personal belongings within reach. Hourly rounding in place. All needs met at this time.

## 2024-04-14 NOTE — OP REPORT
DATE OF SERVICE:  04/14/2024     PREOPERATIVE DIAGNOSES:  1.  Pelvic pain.  2.  Suspected left cornual ectopic pregnancy.  3.  Multiparous, desires sterilization.  4.  Advanced maternal age.  5.  Recent liposuction..     POSTOPERATIVE DIAGNOSES:    1.  Pelvic pain.  2.  Suspected left cornual ectopic pregnancy.  3.  Multiparous, desires sterilization.  4.  Advanced maternal age.  5.  Recent liposuction..     PROCEDURES:    1.  Exam under anesthesia.  2.  Diagnostic laparoscopy.  3.  Lysis of adhesions.  4.  Laparotomy.  5.  Bilateral salpingectomy.  6.  Resection of left cornea for ectopic pregnancy and sharp curettage of the   left cornea and removal of the ectopic.  7.  Tap block by Dr. Vazquez.     SURGEON:  Krysten Dunbra MD     ANESTHESIOLOGIST:  Hari Vazquez MD     TYPE OF ANESTHESIA:  General endotracheal anesthesia.     IV FLUIDS:  1 liter of LR.     URINE OUTPUT:  300 mL.     ESTIMATED BLOOD LOSS:  Less than 50 mL.  I also injected at the left cornea,   10 mL of diluted vasopressin.  The vasopressin 20 mL was diluted in 100 mL of   normal saline and from that diluted concentration I injected 10 mL of   vasopressin into the left cornea.     FINDINGS:  Anteverted uterus about 8-week size with normal bilateral fallopian   tubes and ovaries, bluish discoloration of the left cornea with minimal   outpouching.  Normal ovaries, normal appendix, normal liver.  Omental   adhesions from the anterior abdominal wall to the left side of the pelvis that   were taken down.     COMPLICATIONS:  None.     SPECIMENS:  1.  Corneal resection of the left uterus.  2.  Bilateral fallopian tubes.  3.  Products of conception from sharp curettage of the cornea and suction.     RECOVERY:  Turned to the PACU.     DESCRIPTION OF PROCEDURE:  The patient was taken to the operating room where   she received uncomplicated general endotracheal anesthesia.  She was placed on   Rocco stirrups, prepped and draped in the usual sterile  fashion.  A Zurita was   placed to drain her bladder.  The patient did have bruising all over her   abdomen from her recent liposuction.  At this time, moist and sponge and a   stick was placed in the vagina.  At this time, attention was turned to the   abdomen where a total of 10 mL of Marcaine with 0.25% epinephrine was   injected, 5 at the umbilicus, 5 mL suprapubically.  An 11 mm umbilical   incision was made with a scalpel.  The Veress needle was then introduced with   saline attached.  It was injected and aspirated revealing clear fluid, no   feces and no blood.  An initial entry pressure was read as 3 mm.  CO2 was   connected.  4 liters of CO2 was used to insufflate the pelvis.  At this time,   the Veress needle was then removed.  The 11 mm trocar was introduced without   any problems.  Laparoscope was introduced revealing the above findings.  At   this time, it was noted that she had a large omental adhesion from the   anterior abdominal wall to the left sidewall and I was unable to visualize the   left adnexa well.  Therefore, at this time, a 5 mm suprapubic incision was   made with a scalpel and a 5 mm trocar was introduced without any problems.  An   atraumatic grasper was then introduced and then the omental adhesion that was   obliterating the left sidewall was cauterized and cut with the LigaSure.  At   this time, I was able to see the entire pelvis.  She had a large globular   uterus.  She had normal bilateral ovaries, normal bilateral fallopian tubes,   normal appendix, normal liver.  At this time, I inspected the left fallopian   tube and it was normal, but right at the cornea, there was minimal ____ with   bluish discoloration.  I knew at this time that I needed to perform a wedge   resection of the left cornea and I was not going to be able to do it   laparoscopically.  Therefore at this time, I called for Dr. Brown who was in   labor and delivery to come and assist me with the laparotomy.  The  CO2 was   expelled from the pelvis.  The 11 mm trocar was removed from the umbilicus and   then the 5 mm suprapubic trocar was removed.  At this time, the umbilical   fascial incision was approximated with a 2-0 Vicryl on a CT1 needle and the   skin was approximated with a 4-0 Vicryl on SH needle.  The 5 mm suprapubic   incision was used to extend it laterally to the left and to the right side,   making a low transverse incision.  At this time, this incision was extended   down to the fascia.  The fascia was incised in the midline and extended   laterally with Blackburn scissors.  The inferior aspect of the fascia was grasped   with Kocher clamps, elevated and tented up.  Blackburn scissors were separate   through rectus from the fascia.  In a similar fashion, the superior aspect of   the fascia was grasped with Kocher clamps, elevated and tented up and Blackburn   scissors were separate through rectus from the fascia.  The midline was   identified and entered sharply with Metzenbaum scissors.  The peritoneum was   then extended superiorly and inferiorly.  At this time, the large Adis O was   placed into the pelvis to retract the abdominal wall back.  Dr. Brown was in   labor and delivery, delivering baby and so was Dr. Barraza; therefore, the scrub   tech, David Lovett was my assistant and my scrub tech.  At this time, the right   fallopian tube again was inspected and was found to be normal, normal ovary.    The left fallopian tube was normal, left ovary normal.  I injected 10 mL of   the diluted vasopressin into the left cornea and then at this time using the   LigaSure that I already had from the laparoscopy, I used it.  The left   fallopian tube was grasped at the fimbria and then the mesosalpinx was placed   on traction.  The mesosalpinx was doubly cauterized and cut up to the cornea.    At the cornea, it was cauterized and cut and handed over to the scrub tech   and then at this time, I made about a 2 cm wedge resection on  the left cornea.    This was handed over to the scrub tech and the products of conception that   were seen and these were given to the scrub tech.  I did use the sharp curette   and introduced it into the left corneal endometrial cavity and then I used   the suction to retrieve any other remaining products of conception.  At this   time, there was no evidence of any bleeding and no more evidence of any   remaining ectopic.  Therefore at this time, I closed the myometrium of the   left cornea in 2 separate layers, the first one was a 0 Vicryl on a CT2 needle   in a running fashion and then a second a 0 Vicryl on a CT2 needle to   approximate again the myometrium and then I used a 3-0 Vicryl on an SH needle   to approximate the serosa.  This was found to be hemostatic.  I placed   pressure on this side and then proceeded to the right fallopian tube.  The   right fallopian tube was grasped again at the fimbria and the mesosalpinx was   placed on traction.  The mesosalpinx was doubly cauterized and cut and the   right fallopian tube was excised and handed over to the scrub tech.  The   fallopian tube stump was found to be hemostatic.  I then turned attention   again to the left side where I had removed the fallopian tube and the cornual   resection had been done.  There was no evidence of any bleeding.  I did place   Surgicel powder and then at this time, everything was found to be hemostatic.    I had used 3 moist laps to pack the bowel and these 3 moist laps were   removed.  The large Adis O was removed from the pelvis.  The pelvis again   was found to be hemostatic.  Once the Dais O was removed from the pelvis, I   approximated the peritoneum with a 2-0 Vicryl on a CT1 needle.  The rectus   muscle was irrigated and found to be hemostatic.  Dr. Brown at this time   arrived, but I was already starting to close the fascia and therefore she did   not scrub but just stayed until I had closed the fascia.  The fascia  was then   approximated with 0 Vicryl on a CT1 needle from left to right.  The   subcutaneous tissue was then irrigated and found to be hemostatic and   approximated with a 2-0 Vicryl on a CT1 needle from left to right and then the   skin was approximated with a 4-0 Vicryl on an SH needle from left to right.    Mepilex dressing was placed at the umbilicus.  A Band-Aid was placed.  The   previous sponge and a stick that had been placed in the vagina was removed.    The Zurita was kept in place.  The patient then received a TAP block by Dr. Vazquez.  She then woke up from anesthesia, was taken to the recovery room in   stable condition.  Lap and needle counts were correct x2.        ______________________________  SHABANA GRUBER MD    [facility]/BARBARA    DD:  04/14/2024 02:13  DT:  04/14/2024 03:38    Job#:  [id number]

## 2024-04-14 NOTE — ANESTHESIA PROCEDURE NOTES
Peripheral Block    Date/Time: 4/14/2024 1:44 AM    Performed by: Hari Vazquez M.D.  Authorized by: Hari Vazquez M.D.    Patient Location:  OR  Start Time:  4/14/2024 1:44 AM  Reason for Block: at surgeon's request and post-op pain management ONLY    patient identified, IV checked, site marked, risks and benefits discussed, surgical consent, monitors and equipment checked, pre-op evaluation and timeout performed    Patient Position:  Supine  Prep: ChloraPrep    Monitoring:  Heart rate, continuous pulse ox and cardiac monitor  Block Region:  Trunk  Trunk - Block Type:  Abdominal plane block - TAP block    Laterality:  Bilateral  Procedures: ultrasound guided  Image captured, interpreted and electronically stored.  Local Infiltration:  Lidocaine  Strength:  1 %  Dose:  3 ml  Block Type:  Single-shot  Needle Length:  100mm  Needle Gauge:  21 G  Needle Localization:  Ultrasound guidance  Ultrasound picture in chart  Injection Assessment:  Negative aspiration for heme, no paresthesia on injection, incremental injection and local visualized surrounding nerve on ultrasound  Evidence of intravascular injection: No     US Guided Transversus Abdominis Plane (TAP) Block   US probe placed at the anterior axillary line between the costal margin and iliac crest in an axial plane. External Oblique, Internal Oblique (IO) and Transversis Abdominis (TA) muscles identified.  Needle inserted anteromedial to probe in an in plane approach and advanced under direct visualization to TAP between IO and TA muscled.  After negative aspiration LA injected with ease and visualized spreading in TAP plane.     30ml of 0.5% Bupivacaine was diluted into 20 ml of NS for a total of 50ml. 25ml of mixed solution was then injected on either side for the TAP Block.   US image in chart

## 2024-04-14 NOTE — ANESTHESIA POSTPROCEDURE EVALUATION
Patient: Yaquelin Gallowayubias    Procedure Summary       Date: 04/14/24 Room / Location: [place] [address] / SURGERY [facility]    Anesthesia Start: 0016 Anesthesia Stop: 0156    Procedures:       DIAGNOSTIC LAPAROSCOPY (Abdomen)      BILATERAL SALPINGECTOMY (Bilateral: Fallopian Tube)      LAPAROTOMY; CORNUAL RESECTION OF LEFT ECTOPIC PREGNANCY (Abdomen)      LYSIS OF ADHESIONS (Abdomen) Diagnosis: (left cornual ectopic pregnancy; STERILIZATION)    Surgeons: Krysten Dunbar M.D. Responsible Provider: Hari Vazquez M.D.    Anesthesia Type: general ASA Status: 2 - Emergent            Final Anesthesia Type: general  Last vitals  BP   Blood Pressure: 116/62    Temp   36.7 °C (98.1 °F)    Pulse   (!) 104   Resp   12    SpO2   99 %      Anesthesia Post Evaluation    Patient location during evaluation: PACU  Patient participation: complete - patient participated  Level of consciousness: awake and alert  Pain score: 3    Airway patency: patent  Anesthetic complications: no  Cardiovascular status: hemodynamically stable  Respiratory status: acceptable  Hydration status: euvolemic    PONV: none          There were no known notable events for this encounter.     Nurse Pain Score: 3 (NPRS)

## 2024-04-14 NOTE — ED TRIAGE NOTES
Chief Complaint   Patient presents with    Flank Pain     Since yesterday    Abdominal Pain     Lower abd pain since yesterday.

## 2024-04-14 NOTE — ED PROVIDER NOTES
"ED Provider Note    Scribed for Jayesh Parker by Stefanie Abad. 4/13/2024  7:36 PM    Primary care provider: Pcp Pt States None  Means of arrival: Private vehicle  History obtained from: Patient  History limited by: None    CHIEF COMPLAINT  Chief Complaint   Patient presents with    Flank Pain     Since yesterday    Abdominal Pain     Lower abd pain since yesterday.      EXTERNAL RECORDS REVIEWED  Inpatient Notes The patient was admitted in May 2023 for asthma exacerbation.the patient was treated with 2 liters nasal cannula, IV Solu-Medrol and DuoNeb breathing treatments and was then discharged 2 days later.     HPI/ROS  LIMITATION TO HISTORY   Select: Language Kazakh,  Used   OUTSIDE HISTORIAN(S):  Family Daughter and son at bedside as translators to confirm sequence of events and collateral information provided.     HPI  Yaquelin Mccray is a 39 y.o. female who presents to the Emergency Department for evaluation of right sided trapezius pain and left lower back pain onset 1 day ago. The patient describes that the pain started in her right shoulder and then she began to experience left lower back pain.  The patient states that she also has lower abdominal pain, but denies any dysuria, constipation, diarrhea. The patient's daughter reports that the patient went to [place] to get liposuction last week. The patient's daughter also mentions that the patient \"has been trying to get the fluid from her abdomen drained,\" as the daughter states that the patient was instructed to get this done, but the patient has not been able to get this done.       REVIEW OF SYSTEMS  As above, all other systems reviewed and are negative.   See HPI for further details.     PAST MEDICAL HISTORY   The patient has history of asthma.     SURGICAL HISTORY   has a past surgical history that includes primary c section.    SOCIAL HISTORY  Social History     Tobacco Use    Smoking status: Never    " "Smokeless tobacco: Never   Vaping Use    Vaping Use: Never used   Substance Use Topics    Alcohol use: No     Comment: social    Drug use: No      Social History     Substance and Sexual Activity   Drug Use No     FAMILY HISTORY  Family History   Problem Relation Age of Onset    No Known Problems Mother     No Known Problems Father      CURRENT MEDICATIONS  Home Medications       Reviewed by Yamilet Zhang R.N. (Registered Nurse) on 04/13/24 at 1919  Med List Status: Partial     Medication Last Dose Status   albuterol 108 (90 Base) MCG/ACT Aero Soln inhalation aerosol  Active   fluticasone-salmeterol (ADVAIR) 100-50 MCG/ACT AEROSOL POWDER, BREATH ACTIVATED  Active                  ALLERGIES  Allergies   Allergen Reactions    Penicillin V Rash     rash       PHYSICAL EXAM    VITAL SIGNS:   Vitals:    04/13/24 1858 04/13/24 1914 04/13/24 2001   BP: 128/65  115/59   Pulse: 94  90   Resp: 18     Temp: 37.7 °C (99.9 °F)     TempSrc: Temporal     SpO2: 97%  97%   Weight:  71.1 kg (156 lb 12 oz)    Height: 1.575 m (5' 2\")       Vitals: My interpretation: normotensive, not tachycardic, afebrile, not hypoxic    Reinterpretation of vitals: Unremarkable and unchanged    PE:   Gen: sitting comfortably, speaking clearly, appears in mild distress   ENT: Mucous membranes moist, posterior pharynx clear, uvula midline, nares patent bilaterally   Neck: Supple, FROM  Pulmonary: Lungs are clear to auscultation bilaterally. No tachypnea  CV:  RRR, no murmur appreciated, pulses 2+ in both upper and lower extremities  Abdomen: soft, NT/ND; no rebound/guarding   : no CVA or suprapubic tenderness   Neuro: A&Ox4 (person, place, time, situation), speech fluent, gait steady, no focal deficits appreciated  Skin: No rash or lesions.  No pallor or jaundice.  No cyanosis.  Warm and dry.   2 well healing liposuction surgical incisions in the groin bilaterally. 1 well healing surgical incision in the back that is midline.  Appropriate appearing " post liposuction bruising.    DIAGNOSTIC STUDIES / PROCEDURES    LABS  Results for orders placed or performed during the hospital encounter of 04/13/24   CBC WITH DIFFERENTIAL   Result Value Ref Range    WBC 8.6 4.8 - 10.8 K/uL    RBC 3.63 (L) 4.20 - 5.40 M/uL    Hemoglobin 11.0 (L) 12.0 - 16.0 g/dL    Hematocrit 33.0 (L) 37.0 - 47.0 %    MCV 90.9 81.4 - 97.8 fL    MCH 30.3 27.0 - 33.0 pg    MCHC 33.3 32.2 - 35.5 g/dL    RDW 43.8 35.9 - 50.0 fL    Platelet Count See Note 164 - 446 K/uL    MPV 11.0 9.0 - 12.9 fL    Neutrophils-Polys 65.70 44.00 - 72.00 %    Lymphocytes 23.10 22.00 - 41.00 %    Monocytes 6.70 0.00 - 13.40 %    Eosinophils 3.60 0.00 - 6.90 %    Basophils 0.60 0.00 - 1.80 %    Immature Granulocytes 0.30 0.00 - 0.90 %    Nucleated RBC 0.00 0.00 - 0.20 /100 WBC    Neutrophils (Absolute) 5.67 1.82 - 7.42 K/uL    Lymphs (Absolute) 2.00 1.00 - 4.80 K/uL    Monos (Absolute) 0.58 0.00 - 0.85 K/uL    Eos (Absolute) 0.31 0.00 - 0.51 K/uL    Baso (Absolute) 0.05 0.00 - 0.12 K/uL    Immature Granulocytes (abs) 0.03 0.00 - 0.11 K/uL    NRBC (Absolute) 0.00 K/uL   HCG QUAL SERUM   Result Value Ref Range    Beta-Hcg Qualitative Serum Positive (A) Negative   URINALYSIS,CULTURE IF INDICATED    Specimen: Urine, Clean Catch   Result Value Ref Range    Color Yellow     Character Clear     Specific Gravity 1.005 <1.035    Ph 6.5 5.0 - 8.0    Glucose Negative Negative mg/dL    Ketones Negative Negative mg/dL    Protein Negative Negative mg/dL    Bilirubin Negative Negative    Urobilinogen, Urine 0.2 Negative    Nitrite Negative Negative    Leukocyte Esterase Negative Negative    Occult Blood Negative Negative    Micro Urine Req see below    HCG QUANTITATIVE SERUM   Result Value Ref Range    Bhcg 8075.0 (H) 0.0 - 5.0 mIU/mL   Comp Metabolic Panel   Result Value Ref Range    Sodium 135 135 - 145 mmol/L    Potassium 3.6 3.6 - 5.5 mmol/L    Chloride 103 96 - 112 mmol/L    Co2 20 20 - 33 mmol/L    Anion Gap 12.0 7.0 - 16.0     Glucose 84 65 - 99 mg/dL    Bun 8 8 - 22 mg/dL    Creatinine 0.35 (L) 0.50 - 1.40 mg/dL    Calcium 8.5 8.5 - 10.5 mg/dL    Correct Calcium 8.9 8.5 - 10.5 mg/dL    AST(SGOT) 26 12 - 45 U/L    ALT(SGPT) 42 2 - 50 U/L    Alkaline Phosphatase 82 30 - 99 U/L    Total Bilirubin 0.7 0.1 - 1.5 mg/dL    Albumin 3.5 3.2 - 4.9 g/dL    Total Protein 6.1 6.0 - 8.2 g/dL    Globulin 2.6 1.9 - 3.5 g/dL    A-G Ratio 1.3 g/dL   LIPASE   Result Value Ref Range    Lipase 28 11 - 82 U/L   ESTIMATED GFR   Result Value Ref Range    GFR (CKD-EPI) 132 >60 mL/min/1.73 m 2      All labs reviewed by me. Labs were compared to prior labs if they were available. Significant for no leukocytosis, mild anemia down 3 points in the last year, positive pregnancy test, urinalysis negative for infection or blood, pregnancy hormone of 8000, normal electrolytes, normal renal function, normal liver enzymes, no bilirubin, lipase normal.    US-OB 1ST TRIMESTER WITH TRANSVAGINAL (COMBO)   Final Result      Possible cornual or interstitial ectopic pregnancy in the left cornua. No fetal pole or yolk sac is identified. Emergent OB/GYN consultation is recommended.      Attempts to convey these findings to Dr. CLEM WEST were initiated on 4/13/2024 10:05 PM. Findings were conveyed to Dr. CLEM WEST on 4/13/2024 10:21 PM.        COURSE & MEDICAL DECISION MAKING  Nursing notes, VS, PMSFHx, labs reviewed in chart.    ED Observation Status? No; Patient does not meet criteria for ED Observation.     Ddx: Postoperative pain, postoperative infection, kidney stone, muscle skeletal pain    MDM: 7:36 PM Yaquelin Mccray is a 39 y.o. female who presented with her daughter for evaluation of post liposuction pain.  Last week patient flew to [place] to have liposuction done.  Says since then she has had some back and flank pain as well as obvious postoperative bruising.  She is fairly healthy otherwise.  She had some mild left-sided back and  flank pain with concern for possible UTI although she has no dysuria or hematuria.  Daughter at bedside helps provide collateral formation as well as translation.  Viral here patient's vital signs are completely normal.  Physical exam shows well-healing liposuction surgical incisions and postoperative bruising but no significant point tenderness is noted in the abdomen, flank or back although she has some mild general discomfort to palpation of the paraspinal lumbar muscles on the left.  Overall seems likely that her chief complaint is likely related to her underlying recent liposuction procedure.  Will proceed with labs and urinalysis however to rule out possible concomitant infection.  She was given Tylenol Motrin here to help with pain. All labs reviewed by me. Labs were compared to prior labs if they were available. Significant for no leukocytosis, mild anemia down 3 points in the last year, positive pregnancy test, urinalysis negative for infection or blood, pregnancy hormone of 8000, normal electrolytes, normal renal function, normal liver enzymes, no bilirubin, lipase normal.  Surprisingly patient had a positive qualitative hCG and a quantitative was done showing a pregnancy hormone of 8000.  Ultrasound was done although the patient has no vaginal bleeding or other symptoms related to this, and discussed with radiologist who states there is concern for interstitial ectopic pregnancy and recommends emergent gynecology consultation.  OB/GYN has seen the patient and will take them immediately to surgery.  Patient updated and is amenable.  Patient critically ill but stable at time of admission for surgical intervention.    CRITICAL CARE TIME 38 minutes: Acute life-threatening ectopic pregnancy with new anemia concerning for possible ruptured pregnancy, consultation with OB/GYN, frequent reevaluations, admission for emergent surgery tonight  There was a very real possibility of deterioration of the patient's  condition.  This patient required the highest level of care.  I provided critical care services which included: review of the medical record, treatment orders, ordering and reviewing test results, frequent reevaluation of the patient's condition and response to treatment, as well as discussing the case with appropriate personnel and various consultants. The critical care time associated with the care of this patient is exclusive of any procedures or specific interventions.      ADDITIONAL PROBLEM LIST AND DISPOSITION    I have discussed management of the patient with the following physicians and LUISANA's: Gynecology, radiology.    Discussion of management with other hospitals or appropriate source(s): None     Escalation of care considered, and ultimately not performed:diagnostic imaging    Barriers to care at this time, including but not limited to: Patient does not have established PCP.     Decision tools and prescription drugs considered including, but not limited to:  None .    FINAL IMPRESSION  1. Tubal pregnancy without intrauterine pregnancy, unspecified laterality Acute   2. Post-operative pain Acute   3. Anemia, unspecified type Acute      I, Stefanie Abad (Scribe), am scribing for, and in the presence of, Jayesh Parker.    Electronically signed by: Stefanie Abad (Scribe), 4/13/2024    IJayesh personally performed the services described in this documentation, as scribed by Stefanie Abad in my presence, and it is both accurate and complete.    The note accurately reflects work and decisions made by me.  Jayesh Parker  4/13/2024  7:59 PM

## 2024-04-14 NOTE — CARE PLAN
Problem: Pain - Standard  Goal: Alleviation of pain or a reduction in pain to the patient’s comfort goal  Outcome: Progressing     Problem: Knowledge Deficit - Standard  Goal: Patient and family/care givers will demonstrate understanding of plan of care, disease process/condition, diagnostic tests and medications  Outcome: Progressing     Problem: Wound/ / Incision Healing  Goal: Patient's wound/surgical incision will decrease in size and heals properly  Outcome: Progressing     The patient is Stable - Low risk of patient condition declining or worsening    Shift Goals  Clinical Goals: admission, skin check  Patient Goals: rest, comfort    Progress made toward(s) clinical / shift goals:  Patient oriented to unit. 2 RN skin check complete. Patient reports no pain and slept after admission. Abdominal island dressing CDI.     Patient is not progressing towards the following goals:

## 2024-04-14 NOTE — PROGRESS NOTES
4 Eyes Skin Assessment Completed by YESY Santiago and YESY Mccormick.    Head WDL  Ears WDL  Nose WDL  Mouth WDL  Neck WDL  Breast/Chest Previous liposuction incisions  Shoulder Blades WDL  Spine Scattered bruises to back  (R) Arm/Elbow/Hand Scattered bruising  (L) Arm/Elbow/Hand Scattered bruising  Abdomen Multiple previous liposuction incisions. Bandaid on umbilicus. Transverse silver island dressing.   Groin WDL  Scrotum/Coccyx/Buttocks Incision to sacrum  (R) Leg Bruising  (L) Leg Bruising  (R) Heel/Foot/Toe WDL  (L) Heel/Foot/Toe WDL          Devices In Places Blood Pressure Cuff, Pulse Ox, Zurita, SCD's, and Oxy Mask      Interventions In Place Pillows and Low Air Loss Mattress    Possible Skin Injury No    Pictures Uploaded Into Epic N/A  Wound Consult Placed N/A  RN Wound Prevention Protocol Ordered No

## 2024-04-14 NOTE — OR NURSING
Pt arrives from OR to PACU at 0152. Pt identification verified by team, pt placed on all monitors with alarms audible, report and care of pt received from Anesthesiologist and RN. Assessment completed, pt changed into hospital gown and provided with warm blankets. Pt medicated for pain or nausea.  Family notified and updated. Pt on 10L O2 mask via orders.  Zurita catheter in place. Pt belongings at bedside.     0155:  Brent ([id number]) used for initial communication.      0210:  used for reassessment Stacy ([id number]).    0245: Daughter at bedside.     Report given to Pamela HAYWARD.

## 2024-04-14 NOTE — OR NURSING
0000 Pt Welsh speaking only,  Jose A ([id number]) used to obtain consents and accomplish pre-op checklist.

## 2024-04-14 NOTE — PROGRESS NOTES
Pt admitted to room T438 via transport in [facility] from PACU at 0330. Report received from PACU RN.      Patient reports pain at 0 on a scale of 0-10. Educated patient regarding pharmacologic and non pharmacologic modalities for pain management. Oriented to room call light and smoking policy.  Reviewed plan of care (equipment, incentive spirometer, sequential compression devices, medications, activity, diet, fall precautions, skin care, and pain) with patient and family. Welcome packet given and reviewed with patient, all questions answered. Education provided on oral hygiene program.     A&Ox 4.   Denies CP/SOB. 10L oxymask per ERAS.  NPO diet. Denies N/V.  + void via Zurita. Last BM PTA  See 2 RN skin note.   used.    All needs met at this time. Call light and belongings within reach. Patient calls appropriately. Bed low and locked. Non skid socks in place. Hourly rounding in place.

## 2024-04-14 NOTE — ANESTHESIA TIME REPORT
Anesthesia Start and Stop Event Times       Date Time Event    4/14/2024 0000 Ready for Procedure     0016 Anesthesia Start     0156 Anesthesia Stop          Responsible Staff  04/14/24      Name Role Begin End    Hari Vazquez M.D. Anesth 0016 0156          Overtime Reason:  no overtime (within assigned shift)    Comments:

## 2024-04-15 VITALS
HEART RATE: 91 BPM | TEMPERATURE: 97.9 F | RESPIRATION RATE: 16 BRPM | HEIGHT: 62 IN | DIASTOLIC BLOOD PRESSURE: 68 MMHG | BODY MASS INDEX: 28.71 KG/M2 | SYSTOLIC BLOOD PRESSURE: 109 MMHG | WEIGHT: 156 LBS | OXYGEN SATURATION: 96 %

## 2024-04-15 LAB
B-HCG SERPL-ACNC: 1788 MIU/ML (ref 0–5)
BASOPHILS # BLD AUTO: 0.3 % (ref 0–1.8)
BASOPHILS # BLD: 0.03 K/UL (ref 0–0.12)
EOSINOPHIL # BLD AUTO: 0.11 K/UL (ref 0–0.51)
EOSINOPHIL NFR BLD: 1.1 % (ref 0–6.9)
ERYTHROCYTE [DISTWIDTH] IN BLOOD BY AUTOMATED COUNT: 46.5 FL (ref 35.9–50)
HCT VFR BLD AUTO: 29.9 % (ref 37–47)
HGB BLD-MCNC: 9.7 G/DL (ref 12–16)
IMM GRANULOCYTES # BLD AUTO: 0.04 K/UL (ref 0–0.11)
IMM GRANULOCYTES NFR BLD AUTO: 0.4 % (ref 0–0.9)
LYMPHOCYTES # BLD AUTO: 2.02 K/UL (ref 1–4.8)
LYMPHOCYTES NFR BLD: 21.1 % (ref 22–41)
MCH RBC QN AUTO: 30.1 PG (ref 27–33)
MCHC RBC AUTO-ENTMCNC: 32.4 G/DL (ref 32.2–35.5)
MCV RBC AUTO: 92.9 FL (ref 81.4–97.8)
MONOCYTES # BLD AUTO: 0.53 K/UL (ref 0–0.85)
MONOCYTES NFR BLD AUTO: 5.5 % (ref 0–13.4)
NEUTROPHILS # BLD AUTO: 6.84 K/UL (ref 1.82–7.42)
NEUTROPHILS NFR BLD: 71.6 % (ref 44–72)
NRBC # BLD AUTO: 0 K/UL
NRBC BLD-RTO: 0 /100 WBC (ref 0–0.2)
PATHOLOGY CONSULT NOTE: NORMAL
PLATELET # BLD AUTO: 361 K/UL (ref 164–446)
PMV BLD AUTO: 8.8 FL (ref 9–12.9)
RBC # BLD AUTO: 3.22 M/UL (ref 4.2–5.4)
WBC # BLD AUTO: 9.6 K/UL (ref 4.8–10.8)

## 2024-04-15 PROCEDURE — 85025 COMPLETE CBC W/AUTO DIFF WBC: CPT

## 2024-04-15 PROCEDURE — 84702 CHORIONIC GONADOTROPIN TEST: CPT

## 2024-04-15 PROCEDURE — A9270 NON-COVERED ITEM OR SERVICE: HCPCS | Performed by: OBSTETRICS & GYNECOLOGY

## 2024-04-15 PROCEDURE — 700102 HCHG RX REV CODE 250 W/ 637 OVERRIDE(OP): Performed by: OBSTETRICS & GYNECOLOGY

## 2024-04-15 RX ADMIN — ACETAMINOPHEN 1000 MG: 500 TABLET, FILM COATED ORAL at 00:09

## 2024-04-15 RX ADMIN — ACETAMINOPHEN 1000 MG: 500 TABLET, FILM COATED ORAL at 04:11

## 2024-04-15 RX ADMIN — IBUPROFEN 800 MG: 800 TABLET, FILM COATED ORAL at 04:10

## 2024-04-15 RX ADMIN — DOCUSATE SODIUM 100 MG: 100 CAPSULE, LIQUID FILLED ORAL at 04:11

## 2024-04-15 RX ADMIN — OXYCODONE 5 MG: 5 TABLET ORAL at 08:55

## 2024-04-15 ASSESSMENT — PAIN DESCRIPTION - PAIN TYPE
TYPE: ACUTE PAIN
TYPE: ACUTE PAIN

## 2024-04-15 NOTE — PROGRESS NOTES
DCL orders placed with active DC order. MD rounded this AM and cleared patient to leave after discussion for post op care and followup

## 2024-04-15 NOTE — CARE PLAN
The patient is Stable - Low risk of patient condition declining or worsening    Shift Goals  Clinical Goals: ambulation, tolerate diet  Patient Goals: rest, comfort    Progress made toward(s) clinical / shift goals:  patient up and ambulatory, tolerating regular diet. Staying until tomorrow to manage dizziness and pain    Patient is not progressing towards the following goals:

## 2024-04-15 NOTE — CARE PLAN
The patient is Watcher - Medium risk of patient condition declining or worsening    Shift Goals  Clinical Goals: Ambulation, tolerate diet  Patient Goals: rest, comfort    Progress made toward(s) clinical / shift goals:  Patient was able to ambulate independently overnight. Patient declined pain overnight. Patient was able to tolerate diet overnight.     Patient is not progressing towards the following goals:

## 2024-04-15 NOTE — DISCHARGE INSTRUCTIONS
Discharge Instructions    Discharged to home by car with relative. Discharged via wheelchair, hospital escort: Yes.  Special equipment needed: Not Applicable    Be sure to schedule a follow-up appointment with your primary care doctor or any specialists as instructed.     Discharge Plan:   Diet Plan: Discussed  Confirmed Follow up Appointment: Patient to Call and Schedule Appointment  Confirmed Symptoms Management: Discussed  Medication Reconciliation Updated: Yes    I understand that a diet low in cholesterol, fat, and sodium is recommended for good health. Unless I have been given specific instructions below for another diet, I accept this instruction as my diet prescription.   Other diet: regular    Special Instructions: None    -Is this patient being discharged with medication to prevent blood clots?  No    Is patient discharged on Warfarin / Coumadin?   No

## 2024-04-15 NOTE — PROGRESS NOTES
Progress Note  POD# 2            S/P    1.  Exam under anesthesia.  2.  Diagnostic laparoscopy.  3.  Lysis of adhesions.  4.  Laparotomy.  5.  Bilateral salpingectomy.  6.  Resection of left cornea for ectopic pregnancy and sharp curettage of the left cornea and removal of the ectopic.  7.  Tap block by Dr. Victor     S:  Pt is tolerating regular diet. Pt with adequate pain control.  Pain medication: Adequate.  Flatus: positive  Voiding: without difficulty  PO intake: Tolerating       Objective Data:  Recent Labs     04/13/24  1959 04/14/24  0007 04/15/24  0303   WBC 8.6 10.5 9.6   RBC 3.63* 3.93* 3.22*   HEMOGLOBIN 11.0* 11.9* 9.7*   HEMATOCRIT 33.0* 34.7* 29.9*   MCV 90.9 88.3 92.9   MCH 30.3 30.3 30.1   MCHC 33.3 34.3 32.4   RDW 43.8 42.9 46.5   PLATELETCT See Note 463* 361   MPV 11.0 8.9* 8.8*     Recent Labs     04/13/24  2208   SODIUM 135   POTASSIUM 3.6   CHLORIDE 103   CO2 20   GLUCOSE 84   BUN 8   CREATININE 0.35*   CALCIUM 8.5       Vitals:    04/14/24 2037 04/15/24 0020 04/15/24 0501 04/15/24 0711   BP: 119/62 99/60 114/62 109/68   Pulse: 95 95 81 91   Resp: 16 16 16 16   Temp: 36.6 °C (97.9 °F) 36.6 °C (97.9 °F) 36.6 °C (97.9 °F) 36.6 °C (97.9 °F)   TempSrc: Temporal Temporal Temporal Temporal   SpO2: 97% 97% 95% 96%   Weight:       Height:             Intake/Output Summary (Last 24 hours) at 4/15/2024 0811  Last data filed at 4/15/2024 0045  Gross per 24 hour   Intake 1016.23 ml   Output 2000 ml   Net -983.77 ml       Current Facility-Administered Medications   Medication Dose Route Frequency Provider Last Rate Last Admin    Pharmacy Consult Request ...Pain Management Review 1 Each  1 Each Other PHARMACY TO DOSE Krysten Dunbar M.D.        ondansetron (Zofran) syringe/vial injection 4 mg  4 mg Intravenous Q4HRS PRN Krysten Dunbar M.D.        dexamethasone (Decadron) injection 4 mg  4 mg Intravenous Once PRN Krysten Dunbar M.D.        diphenhydrAMINE (Benadryl) injection 25 mg  25 mg  Intravenous Q6HRS PRN Krysten Dunbar M.D.        haloperidol lactate (Haldol) injection 1 mg  1 mg Intravenous Q6HRS GRETTA Dunbar M.D.        scopolamine (Transderm-Scop) patch 1 Patch  1 Patch Transdermal Q72HRS PRBESSIE Dunbar M.D.        docusate sodium (Colace) capsule 100 mg  100 mg Oral BID Krysten Dunbar M.D.   100 mg at 04/15/24 0411    senna-docusate (Pericolace Or Senokot S) 8.6-50 MG per tablet 1 Tablet  1 Tablet Oral Nightly Krysten Dunbar M.D.   1 Tablet at 04/14/24 2011    senna-docusate (Pericolace Or Senokot S) 8.6-50 MG per tablet 1 Tablet  1 Tablet Oral Q24HRS GRETTA Dunbar M.D.        polyethylene glycol/lytes (Miralax) Packet 1 Packet  1 Packet Oral BID PRBESSIE Dunbar M.D.        magnesium hydroxide (Milk Of Magnesia) suspension 30 mL  30 mL Oral QDAY PRN Krysten Dunbar M.D.        bisacodyl (Dulcolax) suppository 10 mg  10 mg Rectal Q24HRS PRBESSIE Dunbar M.D.        sodium phosphate (Fleet) enema 133 mL  1 Each Rectal Once PRN Krysten Dunbar M.D.        acetaminophen (Tylenol) tablet 1,000 mg  1,000 mg Oral Q6HRS Krysten Dunbar M.D.   1,000 mg at 04/15/24 0411    Followed by    [START ON 4/19/2024] acetaminophen (Tylenol) tablet 1,000 mg  1,000 mg Oral Q6HRS GRETTA Dunbar M.D.        ibuprofen (Motrin) tablet 800 mg  800 mg Oral TID Krysten Dunbar M.D.   800 mg at 04/15/24 0410    Followed by    [START ON 4/19/2024] ibuprofen (Motrin) tablet 800 mg  800 mg Oral TID GRETTA Dunbar M.D.        oxyCODONE immediate-release (Roxicodone) tablet 2.5 mg  2.5 mg Oral Q3HRS GRETTA Dunbar M.D.        Or    oxyCODONE immediate-release (Roxicodone) tablet 5 mg  5 mg Oral Q3HRS GRETTA Dunbar M.D.   5 mg at 04/14/24 1633    Or    HYDROmorphone (Dilaudid) injection 0.25 mg  0.25 mg Intravenous Q3HRS GRETTA Dunbar M.D.        D5LR infusion   Intravenous Continuous  Krysten Dunbar M.D.   Stopped at 04/14/24 1007   A:  POD# 1             S/P 1.  Exam under anesthesia.  2.  Diagnostic laparoscopy.  3.  Lysis of adhesions.  4.  Laparotomy.  5.  Bilateral salpingectomy.  6.  Resection of left cornea for ectopic pregnancy and sharp curettage of the   left cornea and removal of the ectopic.  7.  Tap block by Dr. Vazquez.                Stable/progressing well  P:  DC home and f/u in 2 weeks.    Rx's: Percocet, Ibuprofen

## 2024-04-15 NOTE — PROGRESS NOTES
DC instructions reviewed with patient. Pt verbalizes understanding of wound care. Pt knows to create a follow up appt in 2 weeks. Number highlighted for pt. Pt meds given on discharges and explained dose and frequency.

## 2024-04-15 NOTE — PROGRESS NOTES
Bedside report received.  Assessment complete.  A&O x 4. Patient calls appropriately.  Patient is Malaysian Speaking only.  services in use. Family present at the bedside.   Patient ambulates independently   Patient has 4/10 pain Pt denies need for intervention.  Denies N&V tolerating a regular diet  Patient has an abdominal incision with silver island dressing in place. Dressing is clean, dry, and intact.     + urine + flatus - BM (last bowel movement prior to arrival).  Patient denies SOB.    Review plan with of care with patient. Call light and personal belongings within reach. Hourly rounding in place. All needs met at this time.

## 2024-04-15 NOTE — DISCHARGE SUMMARY
DATE OF ADMISSION:  2024   DATE OF DISCHARGE:  04/15/2024     ADMITTING DIAGNOSES:  1.  Pelvic pain.  2.  Suspected left cornual ectopic pregnancy.  3.  Multiparous, desires permanent sterilization.  4.  Advanced maternal age.  5.  Recent liposuction.     PROCEDURES:  Exam under anesthesia, diagnostic laparoscopy, lysis of   adhesions, laparotomy, bilateral salpingectomy, resection of left cornua  for   ectopic pregnancy and routine postop care.     HOSPITALIZATION COURSE:  This patient is a 39-year-old  3, para 3 that   was admitted for a suspected cornual left ectopic pregnancy.  She was taken to   the operating room where she had the above procedures.  Postoperatively, the   patient did well.  By postop day #1, she was tolerating clear liquid diet,   ambulating and voiding.  She was having adequate pain control.  By postop day   #2, the patient continued to do well.  She was now tolerating a regular diet,   voiding and ambulating.  She was passing flatus and desired to go home.  Her   vitals were stable.  She was afebrile.  Her physical exam was normal including   her wound, which was clean, dry and intact.  Her preop H and H was 11.0 and   33.0 and postop H and H was 9.7 and 29.9 and therefore on postop day #2, the   patient was discharged home, hemodynamically stable.  She was discharged home   on Percocet, ibuprofen and a stool softener.  She was to see me back in 2   weeks and then again at 6 weeks.  The patient to continue with pelvic rest and   no heavy lifting.        ______________________________  SHABANA GRUBER MD    [facility]/SUB/    DD:  04/15/2024 08:10  DT:  04/15/2024 08:21    Job#:  [id number]

## (undated) DEVICE — GLOVE BIOGEL PI INDICATOR SZ 7.5 SURGICAL PF LF -(50/BX 4BX/CA)

## (undated) DEVICE — SET LEADWIRE 5 LEAD BEDSIDE DISPOSABLE ECG (1SET OF 5/EA)

## (undated) DEVICE — LIGASURE 5MM BLUNT TIP LONG - 44CM (6EA/PK)

## (undated) DEVICE — SUTURE 2-0 VICRYL PLUS CT-2 - 27 INCH (36/BX)

## (undated) DEVICE — SUTURE 4-0 VICRYL PLUS FS-2 - 27 INCH (36/BX)

## (undated) DEVICE — DRESSING POST OP BORDER 4INX8IN AG (25/CA)

## (undated) DEVICE — NEEDLE INSFL 120MM 14GA VRRS - (20/BX)

## (undated) DEVICE — GRAFT MESH SEPRAFILM - 10/BX

## (undated) DEVICE — MASK OXYGEN VNYL ADLT MED CONC WITH 7 FOOT TUBING  - (50EA/CA)

## (undated) DEVICE — GOWN SURGEONS X-LARGE - DISP. (30/CA)

## (undated) DEVICE — PAD LAP STERILE 18 X 18 - (5/PK 40PK/CA)

## (undated) DEVICE — TOWELS CLOTH SURGICAL - (4/PK 20PK/CA)

## (undated) DEVICE — GLOVE BIOGEL ECLIPSE  PF LATEX SIZE 6.5 (50PR/BX)

## (undated) DEVICE — GLOVE BIOGEL SZ 6.5 SURGICAL PF LTX (50PR/BX 4BX/CA)

## (undated) DEVICE — TROCAR 5X100 BLADED Z-THREAD - KII (6/BX)

## (undated) DEVICE — SUTURE 0 VICRYL PLUS CT-1 - 36 INCH (36/BX)

## (undated) DEVICE — SODIUM CHL IRRIGATION 0.9% 1000ML (12EA/CA)

## (undated) DEVICE — PENCIL ELECTSURG 10FT BTN SWH - (50/CA)

## (undated) DEVICE — SODIUM CHL. INJ. 0.9% 500ML (24EA/CA 50CA/PF)

## (undated) DEVICE — SUTURE 3-0 VICRYL PLUS SH - 27 INCH (36/BX)

## (undated) DEVICE — CLEANER ELECTRO-SURGICAL TIP - (25/BX 4BX/CA)

## (undated) DEVICE — SYRINGE STERILE 10 ML LL (200/BX)

## (undated) DEVICE — GLOVE SZ 7 BIOGEL PI MICRO - PF LF (50PR/BX 4BX/CA)

## (undated) DEVICE — COVER LIGHT HANDLE ALC PLUS DISP (18EA/BX)

## (undated) DEVICE — TOWEL STOP TIMEOUT SAFETY FLAG (40EA/CA)

## (undated) DEVICE — GLOVE BIOGEL INDICATOR SZ 7SURGICAL PF LTX - (50/BX 4BX/CA)

## (undated) DEVICE — CANISTER SUCTION 3000ML MECHANICAL FILTER AUTO SHUTOFF MEDI-VAC NONSTERILE LF DISP  (40EA/CA)

## (undated) DEVICE — GOWN WARMING STANDARD FLEX - (30/CA)

## (undated) DEVICE — SLEEVE VASO CALF MED - (10PR/CA)

## (undated) DEVICE — KIT  I.V. START (100EA/CA)

## (undated) DEVICE — HEMOSTAT ABSORBABLE POWDER SURGICEL 3G (5EA/BX)

## (undated) DEVICE — TUBING CLEARLINK DUO-VENT - C-FLO (48EA/CA)

## (undated) DEVICE — CANNULA O2 COMFORT SOFT EAR ADULT 7 FT TUBING (50/CA)

## (undated) DEVICE — SENSOR OXIMETER ADULT SPO2 RD SET (20EA/BX)

## (undated) DEVICE — SET EXTENSION WITH 2 PORTS (48EA/CA) ***PART #2C8610 IS A SUBSTITUTE*****

## (undated) DEVICE — RETRACTOR O C SECTION LRY - (5/BX)

## (undated) DEVICE — TRAY SURESTEP FOLEY TEMP SENSING 16FR (10EA/CA) ORDER  #18764 FOR TEMP FOLEY ONLY

## (undated) DEVICE — TROCAR Z THREAD 11 X 100 - BLADED (6/BX)

## (undated) DEVICE — SUTURE GENERAL

## (undated) DEVICE — PACK LAPAROSCOPY - (1/CA)

## (undated) DEVICE — GLOVE BIOGEL INDICATOR SZ 7.5 SURGICAL PF LTX - (50PR/BX 4BX/CA)

## (undated) DEVICE — LACTATED RINGERS INJ 1000 ML - (14EA/CA 60CA/PF)